# Patient Record
Sex: FEMALE | Race: WHITE | NOT HISPANIC OR LATINO
[De-identification: names, ages, dates, MRNs, and addresses within clinical notes are randomized per-mention and may not be internally consistent; named-entity substitution may affect disease eponyms.]

---

## 2019-11-21 PROBLEM — Z00.00 ENCOUNTER FOR PREVENTIVE HEALTH EXAMINATION: Status: ACTIVE | Noted: 2019-11-21

## 2020-02-24 ENCOUNTER — APPOINTMENT (OUTPATIENT)
Dept: PEDIATRIC MEDICAL GENETICS | Facility: CLINIC | Age: 25
End: 2020-02-24

## 2020-07-01 ENCOUNTER — APPOINTMENT (OUTPATIENT)
Dept: PEDIATRIC MEDICAL GENETICS | Facility: CLINIC | Age: 25
End: 2020-07-01
Payer: COMMERCIAL

## 2020-07-01 ENCOUNTER — TRANSCRIPTION ENCOUNTER (OUTPATIENT)
Age: 25
End: 2020-07-01

## 2020-07-01 DIAGNOSIS — F41.9 ANXIETY DISORDER, UNSPECIFIED: ICD-10-CM

## 2020-07-01 DIAGNOSIS — Z84.81 FAMILY HISTORY OF CARRIER OF GENETIC DISEASE: ICD-10-CM

## 2020-07-01 PROCEDURE — 99244 OFF/OP CNSLTJ NEW/EST MOD 40: CPT | Mod: GT

## 2020-07-01 NOTE — HISTORY OF PRESENT ILLNESS
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 She was found to have low platelets on a routine physical and was referred to Hematology. Candice reports longstanding thrombocytopenia, reportedly since middle school. She also has a history of fatigue, difficulty keeping up with her peers, bone pain, easy bruising, abdominal fullness, stomach pains, syncopal episodes, and headaches. Her thrombocytopenia work up showed platelets fluctuating from ~50K-90K. 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 Bone marrow biopsy was done and showed Gaucher cells. Abdominal ultrasound reportedly showed hepatosplenomegaly. 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 She was referred to Dr. Pollock at [place]. Dr. Pollock initially saw her in May 2016 and noted splenomegaly on exam and osteopenia on bone density scan. She was started on Cerdelga 84 mg BID in July 2016 subsequent to CYP2D6 testing and normal EKG. Candice reports that she was compliant with treatment for ~1.5 years but Dr. Pollock noted in May 2017 that she was less than 80% compliant. Candice recalls she had no significant improvements on Cerdelga after 1.5 years and therefore decided to discontinue therapy. Her last Gaucher disease monitoring evaluation was in January 2019. She reports she was found to have progressed to osteoporosis in her hips. She reports more hip and knee pain. She describes some "popping" in her shoulders. She walks a lot but does not do other exercise as it causes exacerbation of her hip pain. She has fatigue, but doesn't feel that it's as severe as it was prior to her diagnosis. She reports difficulty breathing occurring every 1-2 weeks. Can't take a deep breath.  She sometimes has diarrhea, but notes that it is primarily when she eats gluten. She has a history of anxiety, but is currently managing it without medication.

## 2020-07-01 NOTE — REVIEW OF SYSTEMS
[Nl] : Neurological [NI] : Endocrine [Dizziness] : dizziness [Lightheadedness] : lightheadedness [FreeTextEntry4] : easy bruising [FreeTextEntry1] : difficulty breathing every 1-2 weeks [FreeTextEntry2] : gluten sensitivity

## 2020-07-01 NOTE — REASON FOR VISIT
[Initial - Scheduled] : [unfilled]  is being seen for  ~M an initial scheduled visit [Home] : at home, [unfilled] , at the time of the visit. [Medical Office: ([facility])___] : at the medical office located in  [Other:____] : [unfilled]

## 2020-07-01 NOTE — FAMILY HISTORY
[FreeTextEntry1] : Candice is the child of a non-consanguineous couple of Kh[place] (mother) and mixed  (father) descent.  She has 2 full siblings and 2 maternal half siblings. One full sister and one maternal half sister have been found to be carriers of Gaucher disease. She had a maternal half brother who had trisomy 18 and  in infancy. The rest of the family history is unremarkable and is not significant for birth defects, cognitive disabilities, autism, seizures, musculoskeletal conditions, bleeding conditions, or multiple miscarriages.\par

## 2020-08-24 ENCOUNTER — NON-APPOINTMENT (OUTPATIENT)
Age: 25
End: 2020-08-24

## 2020-12-09 ENCOUNTER — NON-APPOINTMENT (OUTPATIENT)
Age: 25
End: 2020-12-09

## 2021-06-08 ENCOUNTER — NON-APPOINTMENT (OUTPATIENT)
Age: 26
End: 2021-06-08

## 2021-07-02 ENCOUNTER — APPOINTMENT (OUTPATIENT)
Dept: PEDIATRIC MEDICAL GENETICS | Facility: CLINIC | Age: 26
End: 2021-07-02
Payer: COMMERCIAL

## 2021-07-02 PROCEDURE — ZZZZZ: CPT

## 2021-07-02 NOTE — FAMILY HISTORY
[FreeTextEntry1] : Candice is the child of a non-consanguineous couple of [place] (mother) and mixed  (father) descent.  She has 2 full siblings and 2 maternal half siblings. One full sister and one maternal half sister have been found to be carriers of Gaucher disease. She had a maternal half brother who had trisomy 18 and  in infancy. The rest of the family history is unremarkable and is not significant for birth defects, cognitive disabilities, autism, seizures, musculoskeletal conditions, bleeding conditions, or multiple miscarriages.\par

## 2021-07-02 NOTE — REVIEW OF SYSTEMS
[Negative] : Neurological [FreeTextEntry9] : bone pain, primarily around hips and knees after walking long distances

## 2021-07-02 NOTE — PHYSICAL EXAM
[Normal] : without anomalies of lips, teeth or palate [Tandem Gait Normal] : tandem gait normal [de-identified] : mild joint limitations in hands [de-identified] : no tremors

## 2021-07-02 NOTE — HISTORY OF PRESENT ILLNESS
[FreeTextEntry1] : Updated July 2, 2021\sadie Harvey is a 25 year old woman being seen in follow up for Gaucher disease monitoring. She has been on enzyme replacement therapy of Cerezyme since August 2020 and has been doing well on it. She had extreme radiating pain from her hips to her feet during the first infusion. The infusion was stopped for 10 minutes and the pain resolved. It was then restarted at a slower rate. The pain returned, but it was milder. At her next infusion, she was premedicated with 650 mg of Tylenol and started at the initial infusion rate. The pain returned, so the rate was reduced. She reports that she was kept on the slower infusion rate for a while, but is now back to 100 ml/hour. She has not had a recurrence of pain, but does report feeling tired after the infusions. She has had to miss a few infusions this past year, but not more than 2 in a row. Candice reports feeling significant improvements since starting treatment. She is no longer as fatigued, no longer has abdominal pain, diarrhea, headaches, or difficulty breathing. She still has pain in hips and knees. However, prior to treatment she had frequent pain and is now only experiencing pain if she has walked a long distance, is going up stairs or while riding a bicycle. She says the pain is now "less random". She has not had any bone fractures. She still has easy bruising. She saw a neurologist a few months ago due to pain and numbness in the left side of her head and face. She did not have a brain MRI or EEG. Shortly after the initial neurology evaluation, she saw her gynecologist who felt the pain was likely related to her diagnosis of PCOS. She started doing acupuncture ~4 months ago and reports resolution of the pain and improvements in menstrual cramping. She has not had any major illnesses, ED visits, hospitalizations, or surgeries in the past year. Candice did not get any of the blood work or MRI's done last year, which were recommended for baseline prior to starting on Cerezyme. \par \par July 1, 2020\sadie Harvey was diagnosed with Gaucher disease at ~19 years of age. She was found to have low platelets on a routine physical and was referred to Hematology. Candice reports longstanding thrombocytopenia, reportedly since middle school. She also has a history of fatigue, difficulty keeping up with her peers, bone pain, easy bruising, abdominal fullness, stomach pains, syncopal episodes, and headaches. Her thrombocytopenia work up showed platelets fluctuating from ~50K-90K. 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 Bone marrow biopsy was done and showed Gaucher cells. Abdominal ultrasound reportedly showed hepatosplenomegaly. 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 She was referred to Dr. Pollock at [place]. Dr. Pollock initially saw her in May 2016 and noted splenomegaly on exam and osteopenia on bone density scan. She was started on Cerdelga 84 mg BID in July 2016 subsequent to CYP2D6 testing and normal EKG. Candice reports that she was compliant with treatment for ~1.5 years but Dr. Pollock noted in May 2017 that she was less than 80% compliant. Candice recalls she had no significant improvements on Cerdelga after 1.5 years and therefore decided to discontinue therapy. Her last Gaucher disease monitoring evaluation was in January 2019. She reports she was found to have progressed to osteoporosis in her hips. She reports more hip and knee pain. She describes some "popping" in her shoulders. She walks a lot but does not do other exercise as it causes exacerbation of her hip pain. She has fatigue, but doesn't feel that it's as severe as it was prior to her diagnosis. She reports difficulty breathing occurring every 1-2 weeks. Can't take a deep breath.  She sometimes has diarrhea, but notes that it is primarily when she eats gluten. She has a history of anxiety, but is currently managing it without medication.

## 2021-07-02 NOTE — PHYSICAL EXAM
[Normal] : without anomalies of lips, teeth or palate [Tandem Gait Normal] : tandem gait normal [de-identified] : mild joint limitations in hands [de-identified] : no tremors

## 2021-07-02 NOTE — FAMILY HISTORY
[FreeTextEntry1] : Candice is the child of a non-consanguineous couple of [place] sor[place] (mother) and mixed  (father) descent.  She has 2 full siblings and 2 maternal half siblings. One full sister and one maternal half sister have been found to be carriers of Gaucher disease. She had a maternal half brother who had trisomy 18 and  in infancy. The rest of the family history is unremarkable and is not significant for birth defects, cognitive disabilities, autism, seizures, musculoskeletal conditions, bleeding conditions, or multiple miscarriages.\par

## 2021-07-02 NOTE — REASON FOR VISIT
[Follow-Up] : [unfilled]  is being seen for  ~M a follow-up visit [Home] : at home, [unfilled] , at the time of the visit. [Other Location: e.g. Home (Enter Location, City,State)___] : at [unfilled] [Other:____] : [unfilled] [Verbal consent obtained from patient] : the patient, [unfilled]

## 2021-08-11 ENCOUNTER — NON-APPOINTMENT (OUTPATIENT)
Age: 26
End: 2021-08-11

## 2022-03-11 ENCOUNTER — LABORATORY RESULT (OUTPATIENT)
Age: 27
End: 2022-03-11

## 2022-04-25 ENCOUNTER — APPOINTMENT (OUTPATIENT)
Dept: PEDIATRIC MEDICAL GENETICS | Facility: CLINIC | Age: 27
End: 2022-04-25
Payer: COMMERCIAL

## 2022-04-25 ENCOUNTER — TRANSCRIPTION ENCOUNTER (OUTPATIENT)
Age: 27
End: 2022-04-25

## 2022-04-25 PROCEDURE — 99215 OFFICE O/P EST HI 40 MIN: CPT | Mod: 95

## 2022-04-25 RX ORDER — IMIGLUCERASE 40 U/ML
400 INJECTION, POWDER, LYOPHILIZED, FOR SOLUTION INTRAVENOUS
Qty: 14 | Refills: 11 | Status: COMPLETED | COMMUNITY
Start: 2020-07-02 | End: 2022-07-14

## 2022-04-25 NOTE — REASON FOR VISIT
[Follow-Up] : [unfilled]  is being seen for  ~M a follow-up visit [Home] : at home, [unfilled] , at the time of the visit. [Medical Office: ([facility])___] : at the medical office located in  [Other:____] : [unfilled] [Verbal consent obtained from patient] : the patient, [unfilled]

## 2022-04-27 NOTE — END OF VISIT
[FreeTextEntry3] : I personally reviewed the chart and bloodwork and formulated the assessment and plan that were written in the chart by Roma Pate MS, [facility] [Time Spent: ___ minutes] : I have spent [unfilled] minutes of time on the encounter.

## 2022-04-27 NOTE — HISTORY OF PRESENT ILLNESS
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 She presented with low platelets on a routine physical and was referred to Hematology. Candice reports she had longstanding thrombocytopenia, reportedly since middle school, and also had a history of fatigue, difficulty keeping up with her peers, bone pain, easy bruising, abdominal fullness, stomach pains, syncopal episodes, and headaches. Her thrombocytopenia work up showed platelets fluctuating from ~50K-90K. 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 Bone marrow biopsy was done and showed Gaucher cells. Abdominal ultrasound reportedly showed hepatosplenomegaly. 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 She initiated care with Dr. Pollock at [place] in 2016 and was noted to have splenomegaly on exam and osteopenia on bone density scan. She was started on Cerdelga 84 mg BID in July 2016 subsequent to CYP2D6 testing and normal EKG. Candice reports that she was compliant with treatment for ~1.5 years but Dr. Pollock noted in May 2017 that she was less than 80% compliant. Candice recalls she had no significant improvements on Cerdelga after 1.5 years and therefore decided to discontinue therapy. She transferred her care to Dr. Poe in July 2020 and was started on enzyme replacement therapy of Cerezyme in August 2020 at a dose of 60 units/kg. She had her first infusion without premedication, but experienced radiating pain in her hip and legs 15 minutes into the infusion. Dr. Poe started her on premedication with 650 mg of acetaminophen. Until March 2022, she was getting infusions essentially every 2 weeks, with the exception of when she was on vacations. However, she has had recent difficulties with scheduling due to her work and school schedule and the limited hours of the infusion center ([facility] [place]). Therefore, Candice's last infusion was on March 8, 2022. Candice has continued to premedicate with 650 mg of Tylenol. She has not had another episode of pain during the infusion, but she reports dizziness and lightheadedness during or after the infusion approximately half the time, one episode of low blood pressure after an infusion, and abdominal pain a few days after her infusions, with occasional episodes of vomiting and diarrhea. \par \par Candice denies unusual fatigue. She has pain in her hips and knees when standing for more than 15-20 minutes. If she is on her feet for a long time, the next few days she is in a great deal of pain. When she has to walk long distances, she also has significant pain. No bone breaks or fractures. She has easy bruising but this has improved since starting infusions. She has a history of gum bleeding, but this has also improved since starting infusions. She has issues with constipation and diarrhea but attributes it to her poor dietary habits, though she does report improvement since starting infusions. She is still having "bad breathing days". She previously reported difficulty breathing occurring every 1-2 weeks, but reports increasing frequency recently. She has symptoms of seasonal allergies including swollen and watery eyes. She has headaches up to 1-3 times per week. She has occasional numbness on the left side of her face. She has been seen by Neurology, but no follow up was recommended. She reports that she occasionally feels shaky and has had 4 episodes of not being able to maintain her hand grasps. She is not sure if the episodes are related to her infusions. \par \par She is taking buspirone for anxiety, prescribed by an psychiatrist who she sees every 6 weeks. Her most recent reported weight is 99 pounds. \par \sadie Harvey had MRI of the thighs/femurs July 2021, which showed normal bone marrow signal and no avascular necrosis. Dexa scan was also done in July 2021 and showed Z score below expected range for her age. She was referred to Endocrinology, but has not yet made an appointment. \par F/u in late October

## 2022-04-27 NOTE — DATA REVIEWED
[FreeTextEntry1] : We reviewed the following:\par -previous Medical Genetics consult notes\par -PYE115 2D6 genotyping, reported 3/18/22 (normal metabolizer)\par -CBC, CMP, protein electrophoresis\par -plasma glucopsychosine\par -chitotriosidase

## 2022-05-12 ENCOUNTER — APPOINTMENT (OUTPATIENT)
Dept: CARDIOLOGY | Facility: CLINIC | Age: 27
End: 2022-05-12
Payer: COMMERCIAL

## 2022-05-12 ENCOUNTER — NON-APPOINTMENT (OUTPATIENT)
Age: 27
End: 2022-05-12

## 2022-05-12 VITALS
HEIGHT: 60 IN | BODY MASS INDEX: 19.24 KG/M2 | OXYGEN SATURATION: 99 % | WEIGHT: 98 LBS | SYSTOLIC BLOOD PRESSURE: 96 MMHG | DIASTOLIC BLOOD PRESSURE: 60 MMHG | HEART RATE: 74 BPM

## 2022-05-12 DIAGNOSIS — Z78.9 OTHER SPECIFIED HEALTH STATUS: ICD-10-CM

## 2022-05-12 DIAGNOSIS — Z82.49 FAMILY HISTORY OF ISCHEMIC HEART DISEASE AND OTHER DISEASES OF THE CIRCULATORY SYSTEM: ICD-10-CM

## 2022-05-12 PROCEDURE — 99203 OFFICE O/P NEW LOW 30 MIN: CPT | Mod: 25

## 2022-05-12 PROCEDURE — 93000 ELECTROCARDIOGRAM COMPLETE: CPT

## 2022-05-12 NOTE — DISCUSSION/SUMMARY
[FreeTextEntry1] : 26 year old female with with PMhx of Gaucher Disease (Type I)  and anxiety presents for a cardiac evaluation and screening ECG prior to starting Cerdelga® (eliglustat), the oral substrate-reduction therapy. It is noted that at high plasma concentration levels this medication may cause increase in intervals (TN, QT, QRS) and may cause arrhythmias. Patient was on this medication in the past and reported no issues. \par \par Patient has no chest pain, SOB, or significant palpitations. No lightheadedness or syncope.\par \par There is no history of MI, CVA, CHF, or previous coronary intervention.\par \par Stable from a cardiology standpoint to proceed with eliglustat therapy. Patient should have periodic screening ECGs, which she will follow up in our office for while on medical therapy.

## 2022-05-12 NOTE — REVIEW OF SYSTEMS
[Fever] : no fever [Chills] : no chills [Feeling Fatigued] : feeling fatigued [Negative] : Neurological [de-identified] : see HPI [de-identified] : see HPI

## 2022-05-12 NOTE — CARDIOLOGY SUMMARY
[de-identified] : 5/12/2022, NSR (mildly short DC interval). All other intervals within normal limits.

## 2022-05-12 NOTE — HISTORY OF PRESENT ILLNESS
[FreeTextEntry1] : 26 year old female with with PMhx of Gaucher Disease (Type I)  and anxiety presents for a cardiac evaluation and screening ECG prior to starting Cerdelga® (eliglustat), the oral substrate-reduction therapy. It is noted that at high plasma concentration levels this medication may cause increase in intervals (WV, QT, QRS) and may cause arrhythmias. Patient was on this medication in the past and reported no issues. \par \par Patient has no chest pain, SOB, or significant palpitations. No lightheadedness or syncope.\par \par There is no history of MI, CVA, CHF, or previous coronary intervention.\par

## 2022-07-14 ENCOUNTER — APPOINTMENT (OUTPATIENT)
Dept: PEDIATRIC MEDICAL GENETICS | Facility: CLINIC | Age: 27
End: 2022-07-14

## 2022-07-19 ENCOUNTER — NON-APPOINTMENT (OUTPATIENT)
Age: 27
End: 2022-07-19

## 2022-08-04 ENCOUNTER — APPOINTMENT (OUTPATIENT)
Dept: CARDIOLOGY | Facility: CLINIC | Age: 27
End: 2022-08-04

## 2022-08-04 ENCOUNTER — NON-APPOINTMENT (OUTPATIENT)
Age: 27
End: 2022-08-04

## 2022-08-04 VITALS
WEIGHT: 100 LBS | DIASTOLIC BLOOD PRESSURE: 62 MMHG | SYSTOLIC BLOOD PRESSURE: 102 MMHG | HEIGHT: 60 IN | HEART RATE: 91 BPM | BODY MASS INDEX: 19.63 KG/M2 | OXYGEN SATURATION: 100 %

## 2022-08-04 PROCEDURE — 99213 OFFICE O/P EST LOW 20 MIN: CPT | Mod: 25

## 2022-08-04 PROCEDURE — 93000 ELECTROCARDIOGRAM COMPLETE: CPT

## 2022-08-04 NOTE — HISTORY OF PRESENT ILLNESS
[FreeTextEntry1] : Historical Perspective:\par 26 year old female with with PMhx of Gaucher Disease (Type I)  and anxiety presents for a cardiac evaluation and screening ECG prior to starting Cerdelga® (eliglustat), the oral substrate-reduction therapy. It is noted that at high plasma concentration levels this medication may cause increase in intervals (MI, QT, QRS) and may cause arrhythmias. Patient was on this medication in the past and reported no issues. \par \par Patient has no chest pain, SOB, or significant palpitations. No lightheadedness or syncope.\par \par There is no history of MI, CVA, CHF, or previous coronary intervention.\par \par Current Health Status:\par Since seeing me last patient started on Cerdelga for the past two months. No issues. No lightheadedness, syncope. \par

## 2022-08-04 NOTE — REVIEW OF SYSTEMS
[Fever] : no fever [Chills] : no chills [Feeling Fatigued] : feeling fatigued [Negative] : Neurological [de-identified] : see HPI [de-identified] : see HPI

## 2022-08-04 NOTE — CARDIOLOGY SUMMARY
[de-identified] : 8/4/2022, NSR (short AZ interval, no pre-excitation, all other intervals within normal limits.\par 5/12/2022, NSR (mildly short AZ interval, no pre-excitation), all other intervals within normal limits.

## 2022-08-04 NOTE — DISCUSSION/SUMMARY
[FreeTextEntry1] : 26 year old female with with PMhx of Gaucher Disease (Type I)  and anxiety presents for a cardiac evaluation and screening ECG prior to starting Cerdelga® (eliglustat), the oral substrate-reduction therapy. It is noted that at high plasma concentration levels this medication may cause increase in intervals (MS, QT, QRS) and may cause arrhythmias. Patient was on this medication in the past and reported no issues.\par \par Patient currently on medication for two months. No issues.  \par \par No chest pain, SOB, or significant palpitations. No lightheadedness or syncope.\par \par There is no history of MI, CVA, CHF, or previous coronary intervention.\par \par ECG today is stable. Stable from a cardiology standpoint to proceed with eliglustat therapy. \par \par Patient should have another ECG in 6 months. If stable at that time, yearly ECGs appropriate, as long as no potential cardiac symptoms.

## 2023-02-07 ENCOUNTER — NON-APPOINTMENT (OUTPATIENT)
Age: 28
End: 2023-02-07

## 2023-02-07 ENCOUNTER — APPOINTMENT (OUTPATIENT)
Dept: CARDIOLOGY | Facility: CLINIC | Age: 28
End: 2023-02-07
Payer: COMMERCIAL

## 2023-02-07 VITALS
BODY MASS INDEX: 19.24 KG/M2 | WEIGHT: 98 LBS | SYSTOLIC BLOOD PRESSURE: 102 MMHG | HEART RATE: 84 BPM | OXYGEN SATURATION: 99 % | HEIGHT: 60 IN | DIASTOLIC BLOOD PRESSURE: 60 MMHG

## 2023-02-07 DIAGNOSIS — Z13.6 ENCOUNTER FOR SCREENING FOR CARDIOVASCULAR DISORDERS: ICD-10-CM

## 2023-02-07 PROCEDURE — 99213 OFFICE O/P EST LOW 20 MIN: CPT | Mod: 25

## 2023-02-07 PROCEDURE — 93000 ELECTROCARDIOGRAM COMPLETE: CPT

## 2023-02-07 NOTE — CARDIOLOGY SUMMARY
[de-identified] : 2/7/2023, NSR (short PA interval, no pre-excitation), all other intervals within normal limits \par 8/4/2022, NSR (short PA interval, no pre-excitation), all other intervals within normal limits\par 5/12/2022, NSR (mildly short PA interval, no pre-excitation), all other intervals within normal limits.

## 2023-02-07 NOTE — DISCUSSION/SUMMARY
[FreeTextEntry1] : 26 year old female with with PMhx of Gaucher Disease (Type I)  and anxiety presents for a cardiac evaluation and screening ECG prior to starting Cerdelga® (eliglustat), the oral substrate-reduction therapy. It is noted that at high plasma concentration levels this medication may cause increase in intervals (AZ, QT, QRS) and may cause arrhythmias. Patient was on this medication in the past and reported no issues.\par \par Patient currently on medication. No issues.  \par \par No chest pain, SOB, or significant palpitations. No lightheadedness or syncope.\par \par There is no history of MI, CVA, CHF, or previous coronary intervention.\par \par ECG today is stable. Stable from a cardiology standpoint to proceed with eliglustat therapy. \par \par Patient should have another ECG in 12 months.  [EKG obtained to assist in diagnosis and management of assessed problem(s)] : EKG obtained to assist in diagnosis and management of assessed problem(s)

## 2023-02-07 NOTE — HISTORY OF PRESENT ILLNESS
[FreeTextEntry1] : Historical Perspective:\par 26 year old female with with PMhx of Gaucher Disease (Type I)  and anxiety presents for a cardiac evaluation and screening ECG prior to starting Cerdelga® (eliglustat), the oral substrate-reduction therapy. It is noted that at high plasma concentration levels this medication may cause increase in intervals (VT, QT, QRS) and may cause arrhythmias. Patient was on this medication in the past and reported no issues. \par \par Patient has no chest pain, SOB, or significant palpitations. No lightheadedness or syncope.\par \par There is no history of MI, CVA, CHF, or previous coronary intervention.\par \par Current Health Status:\par Since seeing me last patient started on Cerdelga for the past two months. No issues. No lightheadedness, syncope. \par

## 2023-02-07 NOTE — REVIEW OF SYSTEMS
[Feeling Fatigued] : feeling fatigued [Negative] : Neurological [Fever] : no fever [Chills] : no chills [de-identified] : see HPI [de-identified] : see HPI

## 2023-05-30 ENCOUNTER — APPOINTMENT (OUTPATIENT)
Dept: RHEUMATOLOGY | Facility: CLINIC | Age: 28
End: 2023-05-30
Payer: COMMERCIAL

## 2023-05-30 ENCOUNTER — NON-APPOINTMENT (OUTPATIENT)
Age: 28
End: 2023-05-30

## 2023-05-30 VITALS
SYSTOLIC BLOOD PRESSURE: 115 MMHG | BODY MASS INDEX: 21.2 KG/M2 | TEMPERATURE: 98 F | HEART RATE: 79 BPM | DIASTOLIC BLOOD PRESSURE: 72 MMHG | HEIGHT: 60 IN | OXYGEN SATURATION: 98 % | WEIGHT: 108 LBS

## 2023-05-30 DIAGNOSIS — R23.1 PALLOR: ICD-10-CM

## 2023-05-30 PROCEDURE — 99204 OFFICE O/P NEW MOD 45 MIN: CPT

## 2023-05-30 NOTE — PHYSICAL EXAM
[General Appearance - Alert] : alert [General Appearance - In No Acute Distress] : in no acute distress [General Appearance - Well Developed] : well developed [General Appearance - Well-Appearing] : healthy appearing [Sclera] : the sclera and conjunctiva were normal [Extraocular Movements] : extraocular movements were intact [Outer Ear] : the ears and nose were normal in appearance [Neck Appearance] : the appearance of the neck was normal [Exaggerated Use Of Accessory Muscles For Inspiration] : no accessory muscle use [Edema] : there was no peripheral edema [] : no rash [Skin Lesions] : no skin lesions [FreeTextEntry1] : 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

## 2023-05-30 NOTE — ASSESSMENT
[FreeTextEntry1] : 27F with Gauchers disease with reported chronic thrombocytopenia and reported early onset osteoporosis, also anxiety, here for evaluation of multiple symptoms including fatigue, oral ulcers and reported rash/erythema in malar distribution (although currently not having facial erythema).\par \par Unclear if these symptoms can be explained by Gaucher disease but at this time I will evaluate for autoimmune conditions including SLE, Sjogrens, RA, and scleroderma, as well as antithyroid antibodies as those could cause fatigue as well.\par Patient with +Raynauds on exam and livedo reticularis- Raynauds may be either primary or secondary to an autoimmune condition- regardless of etiology, pt was advised to keep hands and feet warm- if it becomes a frequent issue or she develops digital ulcerations she was advised to call the office and she would need treatment with calcium channel blockers. \par \par Further plan to follow pending results of bloodwork. \par Pt was advised to see ENT specialist regarding vertigo and hissing sounds in ear.\par Will also see a pulmonologist as she gets sporadic bouts of having to take deep breaths/dyspnea- which she is not having currently.

## 2023-05-30 NOTE — HISTORY OF PRESENT ILLNESS
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 \par \par -hissing/popping sounds in ears, as well as vertigo for the past year\par - numbness below L. eye and L. sided headache occasionally\par - redness on face in malar distribution, not entirely but covering nasal bridge- not currently having this erythema. Occurs sporadically, feels hot. \par - eyelids swell b/l and feel sore- has had this for a long time; more often than facial erythema\par - significant fatigue, out of proportion to Gaucher- needs to sleep at least 10 hrs/night to function. \par - occasional sores on sides of tongue, occasionally painful, sometimes not.\par - hands/feet get really cold and turn purple and occasionally numb- gets numb when driving. \par - gets joint pain in b/l hips, knees, ankles, unsure if it is from her Gaucher disease, unclear if she has associated stiffness. No joint swelling. No hx of DVT/PE/miscarriage/pregnancies.\par - gets fever at least 100.3F at least once/month with no other associated symptoms. \par \par +occasional dry mouth when eyelids get swollen. +dry mouth. \par No hair loss. Weight fluctuates- no significant weight loss. \par +Raynauds. No dysphagia or skin tightening. No hematuria. \par NO dyspnea today but feels on some days it is harder for her to breathe. Is supposed to see a pulmonologist.  [Anorexia] : no anorexia [Weight Loss] : no weight loss [Malaise] : no malaise [Malar Facial Rash] : no malar facial rash [Skin Nodules] : no skin nodules [Oral Ulcers] : no oral ulcers [Dysphagia] : no dysphagia [Shortness of Breath] : no shortness of breath [Chest Pain] : no chest pain [Joint Swelling] : no joint swelling [Joint Deformity] : no joint deformity [Morning Stiffness] : no morning stiffness [Difficulty Standing] : no difficulty standing [Difficulty Walking] : no difficulty walking [Eye Pain] : no eye pain [Eye Redness] : no eye redness

## 2023-05-30 NOTE — REVIEW OF SYSTEMS
[Recent Weight Loss (___ Lbs)] : no recent weight loss [Dry Eyes] : dryness of the eyes [Cough] : no cough [Arthralgias] : arthralgias [Joint Pain] : joint pain [Joint Swelling] : no joint swelling [Joint Stiffness] : no joint stiffness [As Noted in HPI] : as noted in HPI [Anxiety] : anxiety [Negative] : Heme/Lymph [FreeTextEntry4] : hissing sounds in ear, vertigo

## 2023-06-08 ENCOUNTER — LABORATORY RESULT (OUTPATIENT)
Age: 28
End: 2023-06-08

## 2023-06-15 ENCOUNTER — APPOINTMENT (OUTPATIENT)
Dept: PEDIATRIC MEDICAL GENETICS | Facility: CLINIC | Age: 28
End: 2023-06-15
Payer: COMMERCIAL

## 2023-06-15 ENCOUNTER — NON-APPOINTMENT (OUTPATIENT)
Age: 28
End: 2023-06-15

## 2023-06-15 ENCOUNTER — APPOINTMENT (OUTPATIENT)
Dept: PEDIATRIC MEDICAL GENETICS | Facility: CLINIC | Age: 28
End: 2023-06-15

## 2023-06-15 LAB
25(OH)D3 SERPL-MCNC: 30.4 NG/ML
ALBUMIN MFR SERPL ELPH: 57.1 %
ALBUMIN SERPL ELPH-MCNC: 4.7 G/DL
ALBUMIN SERPL-MCNC: 4.5 G/DL
ALBUMIN/GLOB SERPL: 1.4 RATIO
ALP BLD-CCNC: 45 U/L
ALPHA1 GLOB MFR SERPL ELPH: 4.3 %
ALPHA1 GLOB SERPL ELPH-MCNC: 0.3 G/DL
ALPHA2 GLOB MFR SERPL ELPH: 8.9 %
ALPHA2 GLOB SERPL ELPH-MCNC: 0.7 G/DL
ALT SERPL-CCNC: 17 U/L
ANA SER IF-ACNC: NEGATIVE
ANION GAP SERPL CALC-SCNC: 13 MMOL/L
APPEARANCE: CLEAR
AST SERPL-CCNC: 30 U/L
B-GLOBULIN MFR SERPL ELPH: 12.3 %
B-GLOBULIN SERPL ELPH-MCNC: 1 G/DL
B2 GLYCOPROT1 AB SER QL: NEGATIVE
BILIRUB SERPL-MCNC: 0.4 MG/DL
BILIRUBIN URINE: NEGATIVE
BLOOD URINE: NEGATIVE
BUN SERPL-MCNC: 15 MG/DL
C3 SERPL-MCNC: 109 MG/DL
C4 SERPL-MCNC: 27 MG/DL
CALCIUM SERPL-MCNC: 9.7 MG/DL
CARDIOLIPIN AB SER IA-ACNC: POSITIVE
CCP AB SER IA-ACNC: <8 UNITS
CENTROMERE IGG SER-ACNC: <0.2 AL
CHLORIDE SERPL-SCNC: 101 MMOL/L
CO2 SERPL-SCNC: 24 MMOL/L
COLOR: YELLOW
CREAT SERPL-MCNC: 0.54 MG/DL
CREAT SPEC-SCNC: 95 MG/DL
CREAT/PROT UR: 0.1 RATIO
CRP SERPL-MCNC: <3 MG/L
DSDNA AB SER-ACNC: <12 IU/ML
EGFR: 131 ML/MIN/1.73M2
ENA RNP AB SER IA-ACNC: 0.3 AL
ENA SCL70 IGG SER IA-ACNC: <0.2 AL
ENA SM AB SER IA-ACNC: <0.2 AL
ENA SS-A AB SER IA-ACNC: <0.2 AL
ENA SS-B AB SER IA-ACNC: <0.2 AL
ERYTHROCYTE [SEDIMENTATION RATE] IN BLOOD BY WESTERGREN METHOD: 44 MM/HR
GAMMA GLOB FLD ELPH-MCNC: 1.4 G/DL
GAMMA GLOB MFR SERPL ELPH: 17.4 %
GLUCOSE QUALITATIVE U: NEGATIVE MG/DL
GLUCOSE SERPL-MCNC: 77 MG/DL
INTERPRETATION SERPL IEP-IMP: NORMAL
KETONES URINE: NEGATIVE MG/DL
LEUKOCYTE ESTERASE URINE: NEGATIVE
NITRITE URINE: NEGATIVE
PH URINE: 7.5
POTASSIUM SERPL-SCNC: 4 MMOL/L
PROT SERPL-MCNC: 7.7 G/DL
PROT SERPL-MCNC: 7.8 G/DL
PROT SERPL-MCNC: 7.8 G/DL
PROT UR-MCNC: 10 MG/DL
PROTEIN URINE: NEGATIVE MG/DL
RF+CCP IGG SER-IMP: NEGATIVE
RHEUMATOID FACT SER QL: <10 IU/ML
SODIUM SERPL-SCNC: 138 MMOL/L
SPECIFIC GRAVITY URINE: 1.02
THYROGLOB AB SERPL-ACNC: <20 IU/ML
THYROPEROXIDASE AB SERPL IA-ACNC: 21.3 IU/ML
TSH SERPL-ACNC: 1.43 UIU/ML
UROBILINOGEN URINE: 0.2 MG/DL

## 2023-06-15 PROCEDURE — 99243 OFF/OP CNSLTJ NEW/EST LOW 30: CPT | Mod: 95

## 2023-06-24 ENCOUNTER — NON-APPOINTMENT (OUTPATIENT)
Age: 28
End: 2023-06-24

## 2023-06-29 NOTE — PHYSICAL EXAM
[Normal shape and position] : normal shape and position [Normal] : normal external nasal bridge, nares, tip [de-identified] : no facial rash present today  [de-identified] : limited PE due to telehealth appointment

## 2023-06-29 NOTE — REVIEW OF SYSTEMS
[Fatigue] : fatigue [Tinnitus] : tinnitus [SOB] : shortness of breath [Joint Pain] : joint pain [Rash] : rash [Negative] : Psychiatric [de-identified] : periorbital swelling  [de-identified] : Raynaud's  [de-identified] : hand tremors

## 2023-06-29 NOTE — REVIEW OF SYSTEMS
[Fatigue] : fatigue [Tinnitus] : tinnitus [SOB] : shortness of breath [Joint Pain] : joint pain [Rash] : rash [Negative] : Psychiatric [de-identified] : periorbital swelling  [de-identified] : Raynaud's  [de-identified] : hand tremors

## 2023-06-29 NOTE — REASON FOR VISIT
[Follow-Up] : [unfilled]  is being seen for  ~M a follow-up visit [Home] : at home, [unfilled] , at the time of the visit. [Medical Office: ([facility])___] : at the medical office located in  [Other:____] : [unfilled] [Patient] : the patient [FreeTextEntry3] : Gaucher disease type 1

## 2023-06-29 NOTE — PHYSICAL EXAM
[Normal shape and position] : normal shape and position [Normal] : normal external nasal bridge, nares, tip [de-identified] : no facial rash present today  [de-identified] : limited PE due to telehealth appointment

## 2023-06-29 NOTE — HISTORY OF PRESENT ILLNESS
[FreeTextEntry1] : Candice is a 27 year old woman being seen in follow up for type 1 Gaucher disease. She has been on Cerdelga since June 2022 and reports feeling well on the medication with no reported significant periods of missed doses. She was recently evaluated by Rheumatology for facial redness, Raynaud's, periorbital swelling, joint pain in her knees, ankles and feet, vertigo, fatigue, mouth sores, and occasional low grade fevers (never exceeds 101F).  Her autoimmune workup and labs were all normal.  She had a mildly elevated ESR to 44.  Repeat was recommended in 6 months. She was advised to see ENT and pulmonology but she has not yet scheduled. She is followed by Cardiology. \par \par Candice has a history of anxiety and has been taking buspirone since 2020. She was initially on 7.5 mg.  She is currently being weaned off the medication and is taking 3.25mg.  Candice is concerned because she has been having hand tremors which have been going on for 1 year in which she experiences shaking hands for ~20 minutes/ 1x/week.  Prescription is managed by her psychiatrist at [facility]. \par \par There is no family history of autoimmune issues.  Candice reports having joint pain in her hips, knees and ankles and legs.  She denies abdominal fullness, discomfort, and N/V/D.  Denies easy bleeding or bruising.  \par \par

## 2023-06-29 NOTE — HISTORY OF PRESENT ILLNESS
[FreeTextEntry1] : Candice is a 27 year old woman being seen in follow up for type 1 Gaucher disease. She has been on Cerdelga since June 2022 and reports feeling well on the medication with no reported significant periods of missed doses. She was recently evaluated by Rheumatology for facial redness, Raynaud's, periorbital swelling, joint pain in her knees, ankles and feet, vertigo, fatigue, mouth sores, and occasional low grade fevers (never exceeds 101F).  Her autoimmune workup and labs were all normal.  She had a mildly elevated ESR to 44.  Repeat was recommended in 6 months. She was advised to see ENT and pulmonology but she has not yet scheduled. She is followed by Cardiology. \par \par Candice has a history of anxiety and has been taking buspirone since 2020. She was initially on 7.5 mg.  She is currently being weaned off the medication and is taking 3.25mg.  Candice is concerned because she has been having hand tremors which have been going on for 1 year in which she experiences shaking hands for ~20 minutes/ 1x/week.  Prescription is managed by her psychiatrist at [facility]. \par \par There is no family history of autoimmune issues.  Candice reports having joint pain in her hips, knees and ankles and legs.  She denies abdominal fullness, discomfort, and N/V/D.  Denies easy bleeding or bruising.  \par \par

## 2023-06-29 NOTE — REASON FOR VISIT
[Follow-Up] : [unfilled]  is being seen for  ~M a follow-up visit [Home] : at home, [unfilled] , at the time of the visit. [Medical Office: ([facility])___] : at the medical office located in  [Other:____] : [unfilled] [Patient] : the patient [FreeTextEntry3] : Gaucher disease type 1

## 2023-07-04 DIAGNOSIS — R50.9 FEVER, UNSPECIFIED: ICD-10-CM

## 2023-07-07 ENCOUNTER — NON-APPOINTMENT (OUTPATIENT)
Age: 28
End: 2023-07-07

## 2023-07-20 ENCOUNTER — NON-APPOINTMENT (OUTPATIENT)
Age: 28
End: 2023-07-20

## 2023-07-20 LAB
CA VI IGA AB: 9.2 EU/ML
CA VI IGG AB: 11.8 EU/ML
CA VI IGM AB: 15.8 EU/ML
PSP IGA AB: 14.2 EU/ML
PSP IGG AB: NORMAL
PSP IGM AB: 5.5 EU/ML
SEROLOGY COMMENTS: NORMAL
SP-1 IGA AB: 5 EU/ML
SP-1 IGG AB: 2.6 EU/ML
SP-1 IGM AB: 27.6 EU/ML

## 2023-07-21 ENCOUNTER — OUTPATIENT (OUTPATIENT)
Dept: OUTPATIENT SERVICES | Facility: HOSPITAL | Age: 28
LOS: 1 days | End: 2023-07-21

## 2023-07-21 ENCOUNTER — APPOINTMENT (OUTPATIENT)
Dept: MRI IMAGING | Facility: CLINIC | Age: 28
End: 2023-07-21
Payer: COMMERCIAL

## 2023-07-21 ENCOUNTER — RESULT REVIEW (OUTPATIENT)
Age: 28
End: 2023-07-21

## 2023-07-21 DIAGNOSIS — E75.22 GAUCHER DISEASE: ICD-10-CM

## 2023-07-21 PROCEDURE — 73718 MRI LOWER EXTREMITY W/O DYE: CPT | Mod: 26,RT

## 2023-07-21 PROCEDURE — 73718 MRI LOWER EXTREMITY W/O DYE: CPT | Mod: 26,LT,76

## 2023-07-21 PROCEDURE — 74181 MRI ABDOMEN W/O CONTRAST: CPT | Mod: 26

## 2023-07-26 ENCOUNTER — NON-APPOINTMENT (OUTPATIENT)
Age: 28
End: 2023-07-26

## 2023-07-27 ENCOUNTER — NON-APPOINTMENT (OUTPATIENT)
Age: 28
End: 2023-07-27

## 2023-08-09 ENCOUNTER — NON-APPOINTMENT (OUTPATIENT)
Age: 28
End: 2023-08-09

## 2023-08-10 ENCOUNTER — APPOINTMENT (OUTPATIENT)
Dept: HEMATOLOGY ONCOLOGY | Facility: CLINIC | Age: 28
End: 2023-08-10
Payer: COMMERCIAL

## 2023-08-10 ENCOUNTER — OUTPATIENT (OUTPATIENT)
Dept: OUTPATIENT SERVICES | Facility: HOSPITAL | Age: 28
LOS: 1 days | End: 2023-08-10
Payer: COMMERCIAL

## 2023-08-10 ENCOUNTER — RESULT REVIEW (OUTPATIENT)
Age: 28
End: 2023-08-10

## 2023-08-10 VITALS
SYSTOLIC BLOOD PRESSURE: 102 MMHG | HEART RATE: 80 BPM | DIASTOLIC BLOOD PRESSURE: 70 MMHG | HEIGHT: 60 IN | OXYGEN SATURATION: 98 % | TEMPERATURE: 98.1 F | BODY MASS INDEX: 20.07 KG/M2 | WEIGHT: 102.2 LBS

## 2023-08-10 DIAGNOSIS — C90.0 MULTIPLE MYELOMA: ICD-10-CM

## 2023-08-10 LAB
BASOPHILS # BLD AUTO: 0.04 K/UL — SIGNIFICANT CHANGE UP (ref 0–0.2)
BASOPHILS NFR BLD AUTO: 0.4 % — SIGNIFICANT CHANGE UP (ref 0–2)
EOSINOPHIL # BLD AUTO: 0.11 K/UL — SIGNIFICANT CHANGE UP (ref 0–0.5)
EOSINOPHIL NFR BLD AUTO: 1.2 % — SIGNIFICANT CHANGE UP (ref 0–6)
HCT VFR BLD CALC: 37.8 % — SIGNIFICANT CHANGE UP (ref 34.5–45)
HGB BLD-MCNC: 12.1 G/DL — SIGNIFICANT CHANGE UP (ref 11.5–15.5)
IMM GRANULOCYTES NFR BLD AUTO: 0.3 % — SIGNIFICANT CHANGE UP (ref 0–0.9)
LYMPHOCYTES # BLD AUTO: 2.9 K/UL — SIGNIFICANT CHANGE UP (ref 1–3.3)
LYMPHOCYTES # BLD AUTO: 32 % — SIGNIFICANT CHANGE UP (ref 13–44)
MCHC RBC-ENTMCNC: 29.3 PG — SIGNIFICANT CHANGE UP (ref 27–34)
MCHC RBC-ENTMCNC: 32 GM/DL — SIGNIFICANT CHANGE UP (ref 32–36)
MCV RBC AUTO: 91.5 FL — SIGNIFICANT CHANGE UP (ref 80–100)
MISCELLANEOUS TEST: NORMAL
MONOCYTES # BLD AUTO: 0.51 K/UL — SIGNIFICANT CHANGE UP (ref 0–0.9)
MONOCYTES NFR BLD AUTO: 5.6 % — SIGNIFICANT CHANGE UP (ref 2–14)
NEUTROPHILS # BLD AUTO: 5.48 K/UL — SIGNIFICANT CHANGE UP (ref 1.8–7.4)
NEUTROPHILS NFR BLD AUTO: 60.5 % — SIGNIFICANT CHANGE UP (ref 43–77)
NRBC # BLD: 0 /100 WBCS — SIGNIFICANT CHANGE UP (ref 0–0)
PLATELET # BLD AUTO: 73 K/UL — LOW (ref 150–400)
RBC # BLD: 4.13 M/UL — SIGNIFICANT CHANGE UP (ref 3.8–5.2)
RBC # FLD: 13.2 % — SIGNIFICANT CHANGE UP (ref 10.3–14.5)
WBC # BLD: 9.07 K/UL — SIGNIFICANT CHANGE UP (ref 3.8–10.5)
WBC # FLD AUTO: 9.07 K/UL — SIGNIFICANT CHANGE UP (ref 3.8–10.5)

## 2023-08-10 PROCEDURE — 99215 OFFICE O/P EST HI 40 MIN: CPT

## 2023-08-10 RX ORDER — ACETAMINOPHEN 325 MG/1
325 TABLET, FILM COATED ORAL
Qty: 60 | Refills: 3 | Status: ACTIVE | COMMUNITY
Start: 2023-08-10 | End: 1900-01-01

## 2023-08-10 RX ORDER — ACETAMINOPHEN 325 MG/1
325 TABLET, FILM COATED ORAL
Qty: 60 | Refills: 3 | Status: DISCONTINUED | COMMUNITY
Start: 2023-08-10 | End: 2023-08-10

## 2023-08-10 RX ORDER — IMIGLUCERASE 40 U/ML
400 INJECTION, POWDER, LYOPHILIZED, FOR SOLUTION INTRAVENOUS
Qty: 14 | Refills: 12 | Status: ACTIVE | COMMUNITY
Start: 2023-08-10 | End: 1900-01-01

## 2023-08-10 RX ORDER — EPINEPHRINE 0.3 MG/.3ML
0.3 INJECTION INTRAMUSCULAR
Qty: 1 | Refills: 1 | Status: DISCONTINUED | COMMUNITY
Start: 2022-04-25 | End: 2023-08-10

## 2023-08-10 RX ORDER — EPINEPHRINE 0.3 MG/.3ML
0.3 INJECTION INTRAMUSCULAR
Qty: 1 | Refills: 1 | Status: ACTIVE | COMMUNITY
Start: 2023-08-10 | End: 1900-01-01

## 2023-08-10 RX ORDER — BUSPIRONE HYDROCHLORIDE 7.5 MG/1
7.5 TABLET ORAL DAILY
Refills: 0 | Status: DISCONTINUED | COMMUNITY
End: 2023-08-10

## 2023-08-10 NOTE — PHYSICAL EXAM
[Restricted in physically strenuous activity but ambulatory and able to carry out work of a light or sedentary nature] : Status 1- Restricted in physically strenuous activity but ambulatory and able to carry out work of a light or sedentary nature, e.g., light house work, office work [Normal] : normal appearance, no rash, nodules, vesicles, ulcers, erythema [de-identified] : generally well appearing female, NAD, mildly anxious

## 2023-08-10 NOTE — RESULTS/DATA
[FreeTextEntry1] : #Spinal lesion- MR abdomen 7/21/23- 1cm L renal kidney, 1.1cm T2 bright lesion in the lower lumbar spine  Gaucher's disease is a lysosomal storage disease which can be associated with splenomegaly and 85% of patients, hepatomegaly 62% of patients, thrombocytopenia and 68% of patients.  Other frequently reported clinical manifestations include osteopenia, bone pain, growth retardation and rarely pathologic fractures.  There are increased rates of malignancies particularly lymphoma, leukemia, multiple myeloma in patients with Gaucher's disease.  Additional findings of mild splenomegaly, multiple splenic lesion, liikely related to Gaucher cells  Imaging also revealed a 5cm lesion in the right ovary w/ internal septations These imaging abnormalities are likely related to her Gaucher's disease, however we will send basic workup to rule out metastatic carcinoma or myeloma. Will send  given ovarian lesion and she has been ordered for pelvic ultrasound. Will send myeloma studies and flow cytometry at today's visit. Persistent thrombocytopenia which is apparently a chronic problem related to her Gaucher's and was reason for initial bone marrow biopsy.  Continue to monitor.  RTC in 6-8 weeks to review imaging and labwork.   I personally have spent a total of 60 minutes of time on the date of this encounter reviewing test results, documenting findings, coordinating care and directly consulting with the patient and/or designated family member. Greater than 50% of the face to face encounter time was spent on counseling and/or coordination of care for spinal lesion, thrombocytopenia, Gaucher's disease.

## 2023-08-10 NOTE — HISTORY OF PRESENT ILLNESS
[de-identified] : Referred by: Referred by genetics Kamala Chao  Diagnosis: Spine lesion   Ms. Atkinson presented at age 27 in August 2023 for evaluation of lesion on her spine.  The patient has a medical history of Gaucher's disease, possible sjogrens.   Candice follows closely with genetics for a history of Gaucher's disease-for which she undergoes routine imaging to monitor for splenomegaly.  She underwent recent imaging in July 2023 which revealed lesions in the spleen as well as a new 1 cm lesion in the thoracic spine.  Imaging also revealed a 1 cm left renal cyst and a 5 cm lesion on her right ovary with internal septations for which a pelvic ultrasound was recommended.  Her prior imaging was reviewed from 2021 and the lesion was not present at that time.  She reports that she feels very fatigued despite sleeping 9 to 12 hours/day.  She has chronic pains in her legs hip and back.  She denies any new pains.  She reports that she saw a hematologist back in 2015 when she was 20 years old for evaluation of thrombocytopenia and ultimately underwent a bone marrow biopsy which revealed Gaucher cells.  She also follows with a rheumatologist for possible diagnosis of Sjogren's and suffers from dry eyes and mouth, facial erythema and occasional fevers.  She reports normal menses but occasionally has painful periods. Her weight has been fluctuating between 93 pounds and 107 pounds.  She reports anxiety and previously took BuSpar which she recently discontinued.  States she does acupuncture with helps with her anxiety.  Labs from 6/8/23 notable for WBC 9.96, Hb 11.8, platelet 102   MR abdomen 7/21/23- 1cm L renal kidney, 1.1cm T2 bright lesion in the lower lumbar spine  mild splenomegaly, multiple splenic lesion, liikely related to Gaucher cells  Had 5cm lesion in the right ovary w/ internal septations - needs pelvic US   Genetics: reported Gaucher disease- pending blood work per genetics   HCM:  - COVID vaccination: s/p 2 doses   SH:  - Occupation: worked as a , will be working in the [employer] center  - Living situation: moving to [place] with her   - Smoking/etoh/illicits: never smoker, 1 glass of wine 4-5 times per week  - Exercise: not very physically active, can walk short distances   FH:  - Her maternal aunt had breast cancer  - Maternal grandmother also had breast cancer  - Paternal aunt and grandfather had lung cancer, they were both smokers

## 2023-08-13 ENCOUNTER — NON-APPOINTMENT (OUTPATIENT)
Age: 28
End: 2023-08-13

## 2023-08-13 LAB
ALBUMIN SERPL ELPH-MCNC: 4.9 G/DL
ALP BLD-CCNC: 41 U/L
ALT SERPL-CCNC: 17 U/L
ANION GAP SERPL CALC-SCNC: 13 MMOL/L
AST SERPL-CCNC: 26 U/L
B2 MICROGLOB SERPL-MCNC: 1.9 MG/L
BILIRUB SERPL-MCNC: 0.5 MG/DL
BUN SERPL-MCNC: 11 MG/DL
CALCIUM SERPL-MCNC: 9.9 MG/DL
CANCER AG125 SERPL-ACNC: 7 U/ML
CHLORIDE SERPL-SCNC: 103 MMOL/L
CO2 SERPL-SCNC: 24 MMOL/L
CREAT SERPL-MCNC: 0.61 MG/DL
CRP SERPL-MCNC: <3 MG/L
DEPRECATED KAPPA LC FREE/LAMBDA SER: 1.44 RATIO
EGFR: 126 ML/MIN/1.73M2
ERYTHROCYTE [SEDIMENTATION RATE] IN BLOOD BY WESTERGREN METHOD: 36 MM/HR
FERRITIN SERPL-MCNC: 137 NG/ML
GLUCOSE SERPL-MCNC: 104 MG/DL
IGA SER QL IEP: 414 MG/DL
IGG SER QL IEP: 1108 MG/DL
IGM SER QL IEP: 229 MG/DL
IRON SATN MFR SERPL: 15 %
IRON SERPL-MCNC: 59 UG/DL
KAPPA LC CSF-MCNC: 2 MG/DL
KAPPA LC SERPL-MCNC: 2.89 MG/DL
LDH SERPL-CCNC: 153 U/L
POTASSIUM SERPL-SCNC: 4.3 MMOL/L
PROT SERPL-MCNC: 7.9 G/DL
SODIUM SERPL-SCNC: 139 MMOL/L
TIBC SERPL-MCNC: 404 UG/DL
UIBC SERPL-MCNC: 345 UG/DL

## 2023-08-15 LAB — M PROTEIN SPEC IFE-MCNC: NORMAL

## 2023-08-16 LAB
CHITOTRIOSIDASE SERPL-CCNC: 8042 NMOLES/HR/ML
MISCELLANEOUS TEST: NORMAL
PROC NAME: NORMAL

## 2023-08-17 DIAGNOSIS — E75.22 GAUCHER DISEASE: ICD-10-CM

## 2023-08-17 DIAGNOSIS — M25.551 PAIN IN RIGHT HIP: ICD-10-CM

## 2023-08-21 DIAGNOSIS — C90.00 MULTIPLE MYELOMA NOT HAVING ACHIEVED REMISSION: ICD-10-CM

## 2023-08-21 DIAGNOSIS — I73.00 RAYNAUD'S SYNDROME W/OUT GANGRENE: ICD-10-CM

## 2023-08-21 DIAGNOSIS — M35.00 SICCA SYNDROME, UNSPECIFIED: ICD-10-CM

## 2023-08-27 ENCOUNTER — NON-APPOINTMENT (OUTPATIENT)
Age: 28
End: 2023-08-27

## 2023-09-19 ENCOUNTER — APPOINTMENT (OUTPATIENT)
Dept: RHEUMATOLOGY | Facility: CLINIC | Age: 28
End: 2023-09-19
Payer: COMMERCIAL

## 2023-09-19 DIAGNOSIS — M25.50 PAIN IN UNSPECIFIED JOINT: ICD-10-CM

## 2023-09-19 PROCEDURE — 99215 OFFICE O/P EST HI 40 MIN: CPT | Mod: 95

## 2023-09-28 ENCOUNTER — APPOINTMENT (OUTPATIENT)
Dept: HEMATOLOGY ONCOLOGY | Facility: CLINIC | Age: 28
End: 2023-09-28
Payer: COMMERCIAL

## 2023-09-28 ENCOUNTER — RESULT REVIEW (OUTPATIENT)
Age: 28
End: 2023-09-28

## 2023-09-28 VITALS
WEIGHT: 107 LBS | SYSTOLIC BLOOD PRESSURE: 104 MMHG | HEART RATE: 82 BPM | DIASTOLIC BLOOD PRESSURE: 71 MMHG | BODY MASS INDEX: 21.01 KG/M2 | OXYGEN SATURATION: 98 % | HEIGHT: 60 IN | TEMPERATURE: 98.1 F

## 2023-09-28 DIAGNOSIS — N83.201 UNSPECIFIED OVARIAN CYST, RIGHT SIDE: ICD-10-CM

## 2023-09-28 DIAGNOSIS — M89.9 DISORDER OF BONE, UNSPECIFIED: ICD-10-CM

## 2023-09-28 PROCEDURE — 99214 OFFICE O/P EST MOD 30 MIN: CPT

## 2023-09-28 PROCEDURE — 85027 COMPLETE CBC AUTOMATED: CPT

## 2023-09-29 LAB
ALBUMIN SERPL ELPH-MCNC: 4.7 G/DL
ALBUMIN SERPL ELPH-MCNC: 4.9 G/DL
ALP BLD-CCNC: 42 U/L
ALP BLD-CCNC: 46 U/L
ALT SERPL-CCNC: 15 U/L
ALT SERPL-CCNC: 16 U/L
ANION GAP SERPL CALC-SCNC: 10 MMOL/L
ANION GAP SERPL CALC-SCNC: 12 MMOL/L
AST SERPL-CCNC: 20 U/L
AST SERPL-CCNC: 23 U/L
BASOPHILS # BLD AUTO: 0.03 K/UL — SIGNIFICANT CHANGE UP (ref 0–0.2)
BASOPHILS # BLD AUTO: 0.04 K/UL
BASOPHILS NFR BLD AUTO: 0.3 % — SIGNIFICANT CHANGE UP (ref 0–2)
BASOPHILS NFR BLD AUTO: 0.5 %
BILIRUB SERPL-MCNC: 0.3 MG/DL
BILIRUB SERPL-MCNC: 0.3 MG/DL
BUN SERPL-MCNC: 12 MG/DL
BUN SERPL-MCNC: 15 MG/DL
CALCIUM SERPL-MCNC: 10 MG/DL
CALCIUM SERPL-MCNC: 9.4 MG/DL
CHLORIDE SERPL-SCNC: 102 MMOL/L
CHLORIDE SERPL-SCNC: 104 MMOL/L
CO2 SERPL-SCNC: 24 MMOL/L
CO2 SERPL-SCNC: 25 MMOL/L
CREAT SERPL-MCNC: 0.57 MG/DL
CREAT SERPL-MCNC: 0.58 MG/DL
CRP SERPL-MCNC: <3 MG/L
DEPRECATED KAPPA LC FREE/LAMBDA SER: 1.53 RATIO
EGFR: 128 ML/MIN/1.73M2
EGFR: 128 ML/MIN/1.73M2
EOSINOPHIL # BLD AUTO: 0.03 K/UL — SIGNIFICANT CHANGE UP (ref 0–0.5)
EOSINOPHIL # BLD AUTO: 0.07 K/UL
EOSINOPHIL NFR BLD AUTO: 0.3 % — SIGNIFICANT CHANGE UP (ref 0–6)
EOSINOPHIL NFR BLD AUTO: 0.8 %
ERYTHROCYTE [SEDIMENTATION RATE] IN BLOOD BY WESTERGREN METHOD: 13 MM/HR
GLUCOSE SERPL-MCNC: 173 MG/DL
GLUCOSE SERPL-MCNC: 77 MG/DL
HCT VFR BLD CALC: 37.4 % — SIGNIFICANT CHANGE UP (ref 34.5–45)
HCT VFR BLD CALC: 37.5 %
HGB BLD-MCNC: 12 G/DL
HGB BLD-MCNC: 12.6 G/DL — SIGNIFICANT CHANGE UP (ref 11.5–15.5)
IGA SER QL IEP: 466 MG/DL
IGG SER QL IEP: 1232 MG/DL
IGM SER QL IEP: 238 MG/DL
IMM GRANULOCYTES NFR BLD AUTO: 0.2 %
IMM GRANULOCYTES NFR BLD AUTO: 0.3 % — SIGNIFICANT CHANGE UP (ref 0–0.9)
KAPPA LC CSF-MCNC: 1.71 MG/DL
KAPPA LC SERPL-MCNC: 2.62 MG/DL
LYMPHOCYTES # BLD AUTO: 1.23 K/UL — SIGNIFICANT CHANGE UP (ref 1–3.3)
LYMPHOCYTES # BLD AUTO: 11.1 % — LOW (ref 13–44)
LYMPHOCYTES # BLD AUTO: 2.92 K/UL
LYMPHOCYTES NFR BLD AUTO: 34 %
MAN DIFF?: NORMAL
MCHC RBC-ENTMCNC: 30.4 PG
MCHC RBC-ENTMCNC: 30.7 PG — SIGNIFICANT CHANGE UP (ref 27–34)
MCHC RBC-ENTMCNC: 32 GM/DL
MCHC RBC-ENTMCNC: 33.7 GM/DL — SIGNIFICANT CHANGE UP (ref 32–36)
MCV RBC AUTO: 91 FL — SIGNIFICANT CHANGE UP (ref 80–100)
MCV RBC AUTO: 94.9 FL
MONOCYTES # BLD AUTO: 0.13 K/UL — SIGNIFICANT CHANGE UP (ref 0–0.9)
MONOCYTES # BLD AUTO: 0.52 K/UL
MONOCYTES NFR BLD AUTO: 1.2 % — LOW (ref 2–14)
MONOCYTES NFR BLD AUTO: 6.1 %
NEUTROPHILS # BLD AUTO: 5.02 K/UL
NEUTROPHILS # BLD AUTO: 9.63 K/UL — HIGH (ref 1.8–7.4)
NEUTROPHILS NFR BLD AUTO: 58.4 %
NEUTROPHILS NFR BLD AUTO: 86.8 % — HIGH (ref 43–77)
NRBC # BLD: 0 /100 WBCS — SIGNIFICANT CHANGE UP (ref 0–0)
PLATELET # BLD AUTO: 135 K/UL
PLATELET # BLD AUTO: 142 K/UL — LOW (ref 150–400)
POTASSIUM SERPL-SCNC: 3.9 MMOL/L
POTASSIUM SERPL-SCNC: 4.5 MMOL/L
PROT SERPL-MCNC: 7.5 G/DL
PROT SERPL-MCNC: 8 G/DL
RBC # BLD: 3.95 M/UL
RBC # BLD: 4.11 M/UL — SIGNIFICANT CHANGE UP (ref 3.8–5.2)
RBC # FLD: 12.9 % — SIGNIFICANT CHANGE UP (ref 10.3–14.5)
RBC # FLD: 14 %
SODIUM SERPL-SCNC: 138 MMOL/L
SODIUM SERPL-SCNC: 139 MMOL/L
WBC # BLD: 11.08 K/UL — HIGH (ref 3.8–10.5)
WBC # FLD AUTO: 11.08 K/UL — HIGH (ref 3.8–10.5)
WBC # FLD AUTO: 8.59 K/UL

## 2023-10-03 LAB
ALBUMIN MFR SERPL ELPH: 57.3 %
ALBUMIN MFR SERPL ELPH: 58.4 %
ALBUMIN SERPL-MCNC: 4.3 G/DL
ALBUMIN SERPL-MCNC: 4.8 G/DL
ALBUMIN/GLOB SERPL: 1.3 RATIO
ALBUMIN/GLOB SERPL: 1.4 RATIO
ALPHA1 GLOB MFR SERPL ELPH: 3.7 %
ALPHA1 GLOB MFR SERPL ELPH: 3.8 %
ALPHA1 GLOB SERPL ELPH-MCNC: 0.3 G/DL
ALPHA1 GLOB SERPL ELPH-MCNC: 0.3 G/DL
ALPHA2 GLOB MFR SERPL ELPH: 8.7 %
ALPHA2 GLOB MFR SERPL ELPH: 9.3 %
ALPHA2 GLOB SERPL ELPH-MCNC: 0.7 G/DL
ALPHA2 GLOB SERPL ELPH-MCNC: 0.7 G/DL
B-GLOBULIN MFR SERPL ELPH: 11.7 %
B-GLOBULIN MFR SERPL ELPH: 11.9 %
B-GLOBULIN SERPL ELPH-MCNC: 0.9 G/DL
B-GLOBULIN SERPL ELPH-MCNC: 1 G/DL
G6PD SER-CCNC: 17 U/G HGB
GAMMA GLOB FLD ELPH-MCNC: 1.3 G/DL
GAMMA GLOB FLD ELPH-MCNC: 1.4 G/DL
GAMMA GLOB MFR SERPL ELPH: 17.3 %
GAMMA GLOB MFR SERPL ELPH: 17.9 %
INTERPRETATION SERPL IEP-IMP: NORMAL
INTERPRETATION SERPL IEP-IMP: NORMAL
M PROTEIN SPEC IFE-MCNC: NORMAL
M PROTEIN SPEC IFE-MCNC: NORMAL
PROT SERPL-MCNC: 7.5 G/DL
PROT SERPL-MCNC: 7.5 G/DL
PROT SERPL-MCNC: 8.2 G/DL
PROT SERPL-MCNC: 8.2 G/DL

## 2023-10-24 ENCOUNTER — TRANSCRIPTION ENCOUNTER (OUTPATIENT)
Age: 28
End: 2023-10-24

## 2023-10-24 RX ORDER — PREDNISONE 5 MG/1
5 TABLET ORAL
Qty: 30 | Refills: 0 | Status: DISCONTINUED | COMMUNITY
Start: 2023-09-19 | End: 2023-10-24

## 2023-10-30 ENCOUNTER — NON-APPOINTMENT (OUTPATIENT)
Age: 28
End: 2023-10-30

## 2023-10-31 NOTE — HISTORY OF PRESENT ILLNESS
[FreeTextEntry1] : Updated July 2, 2021\sadie Harvey is a 25 year old woman being seen in follow up for Gaucher disease monitoring. She has been on enzyme replacement therapy of Cerezyme since August 2020 and has been doing well on it. She had extreme radiating pain from her hips to her feet during the first infusion. The infusion was stopped for 10 minutes and the pain resolved. It was then restarted at a slower rate. The pain returned, but it was milder. At her next infusion, she was premedicated with 650 mg of Tylenol and started at the initial infusion rate. The pain returned, so the rate was reduced. She reports that she was kept on the slower infusion rate for a while, but is now back to 100 ml/hour. She has not had a recurrence of pain, but does report feeling tired after the infusions. She has had to miss a few infusions this past year, but not more than 2 in a row. Candice reports feeling significant improvements since starting treatment. She is no longer as fatigued, no longer has abdominal pain, diarrhea, headaches, or difficulty breathing. She still has pain in hips and knees. However, prior to treatment she had frequent pain and is now only experiencing pain if she has walked a long distance, is going up stairs or while riding a bicycle. She says the pain is now "less random". She has not had any bone fractures. She still has easy bruising. She saw a neurologist a few months ago due to pain and numbness in the left side of her head and face. She did not have a brain MRI or EEG. Shortly after the initial neurology evaluation, she saw her gynecologist who felt the pain was likely related to her diagnosis of PCOS. She started doing acupuncture ~4 months ago and reports resolution of the pain and improvements in menstrual cramping. She has not had any major illnesses, ED visits, hospitalizations, or surgeries in the past year. Candice did not get any of the blood work or MRI's done last year, which were recommended for baseline prior to starting on Cerezyme. \par \par July 1, 2020\sadie Harvey was diagnosed with Gaucher disease at ~19 years of age. She was found to have low platelets on a routine physical and was referred to Hematology. Candice reports longstanding thrombocytopenia, reportedly since middle school. She also has a history of fatigue, difficulty keeping up with her peers, bone pain, easy bruising, abdominal fullness, stomach pains, syncopal episodes, and headaches. Her thrombocytopenia work up showed platelets fluctuating from ~50K-90K. 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 Bone marrow biopsy was done and showed Gaucher cells. Abdominal ultrasound reportedly showed hepatosplenomegaly. 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 She was referred to Dr. Pollock at [place]. Dr. Pollock initially saw her in May 2016 and noted splenomegaly on exam and osteopenia on bone density scan. She was started on Cerdelga 84 mg BID in July 2016 subsequent to CYP2D6 testing and normal EKG. Candice reports that she was compliant with treatment for ~1.5 years but Dr. Pollock noted in May 2017 that she was less than 80% compliant. Candice recalls she had no significant improvements on Cerdelga after 1.5 years and therefore decided to discontinue therapy. Her last Gaucher disease monitoring evaluation was in January 2019. She reports she was found to have progressed to osteoporosis in her hips. She reports more hip and knee pain. She describes some "popping" in her shoulders. She walks a lot but does not do other exercise as it causes exacerbation of her hip pain. She has fatigue, but doesn't feel that it's as severe as it was prior to her diagnosis. She reports difficulty breathing occurring every 1-2 weeks. Can't take a deep breath.  She sometimes has diarrhea, but notes that it is primarily when she eats gluten. She has a history of anxiety, but is currently managing it without medication.  Carac Pregnancy And Lactation Text: This medication is Pregnancy Category X and contraindicated in pregnancy and in women who may become pregnant. It is unknown if this medication is excreted in breast milk.

## 2023-12-06 ENCOUNTER — OUTPATIENT (OUTPATIENT)
Dept: OUTPATIENT SERVICES | Facility: HOSPITAL | Age: 28
LOS: 1 days | End: 2023-12-06

## 2023-12-06 DIAGNOSIS — C90.00 MULTIPLE MYELOMA NOT HAVING ACHIEVED REMISSION: ICD-10-CM

## 2023-12-14 ENCOUNTER — APPOINTMENT (OUTPATIENT)
Dept: HEMATOLOGY ONCOLOGY | Facility: CLINIC | Age: 28
End: 2023-12-14

## 2024-01-23 ENCOUNTER — APPOINTMENT (OUTPATIENT)
Dept: NEUROLOGY | Facility: CLINIC | Age: 29
End: 2024-01-23
Payer: COMMERCIAL

## 2024-01-23 VITALS
BODY MASS INDEX: 20.62 KG/M2 | SYSTOLIC BLOOD PRESSURE: 100 MMHG | HEIGHT: 60 IN | OXYGEN SATURATION: 99 % | HEART RATE: 83 BPM | DIASTOLIC BLOOD PRESSURE: 69 MMHG | WEIGHT: 105 LBS

## 2024-01-23 DIAGNOSIS — R53.83 OTHER FATIGUE: ICD-10-CM

## 2024-01-23 DIAGNOSIS — R20.2 PARESTHESIA OF SKIN: ICD-10-CM

## 2024-01-23 DIAGNOSIS — R40.4 TRANSIENT ALTERATION OF AWARENESS: ICD-10-CM

## 2024-01-23 PROCEDURE — 99215 OFFICE O/P EST HI 40 MIN: CPT

## 2024-01-23 PROCEDURE — 99205 OFFICE O/P NEW HI 60 MIN: CPT

## 2024-01-23 NOTE — HISTORY OF PRESENT ILLNESS
[FreeTextEntry1] : 28-year-old right-handed female with a history of Gaucher's disease, anxiety  presents today for  evaluation for multiple complaints   She reports in 2022 she had a bout of dizziness that lasted 6 weeks which she described as room spinning and eyes were off so ophthalmology eventually, testing was normal.  They now occur randomly sometimes with no triggers. she also reports since a diagnosis of Gaucher's disease in 2015 she has been fatigued.  She notes over the past year or so her fatigue has been worse and can sleep up to 20 hours if she can.  At times notes her speech can be off and getting her words out has not noticed if this occurs when she is under significant stressors. Also reports of random pins-and-needles in her left face and left elbow that can last for a day and resolves on its own no triggers.  Past year she has been experiencing headaches that has been occurring 2-3 times a week frontal described as pressure if severe can be associated with some nausea little to no light or sound sensitivity/takes over-the-counter medication which takes the edge of but does not abort them.  They can last hours to days at a time. no known triggers.  Started magnesium 400 mg yesterday but made her stomach upset. She reports having headaches as a teenager occipital and received nerve blocks for them which resolved completely.  She also reports having episodes of daydreaming and zoning out which can be intermittent of conversations.  Collateral from patient's  states that he can be talking to her and will get a response from her and not always recalls the conversation.  Has random anxiety feelings which she feels thoughts from her hands ankles up to her chest with laxation helps with it. Has not noticed the past year or so she had made a lot of changes got a new job, , bought a house    Risk factors: No family history of seizures No Head trauma No CNS infection No Febrile Seizures No ETOH/Drug use NVD, didnt read until age 4. was in inclusion until        FH: Sister with migraines Sleeps: 9 hours uninterrupted grinds her teeth does not use a mouthguard does not snore. Hydration: water: 40 ounces caffeine: 2 cups Triggers: unknown Exercise: None due to chronic hip pain

## 2024-01-23 NOTE — ASSESSMENT
[FreeTextEntry1] : 28-year-old right-handed female with a history of Gaucher's disease, anxiety presents today for evaluation for multiple complaints for chronic fatigue, headaches , dizziness and staring episodes that has been more pronounced over the past year.  Neuro exam nonfocal will obtain brain MRI with and without to rule out any structural lesions, 24-hour EEG rule out any epileptiform changes.   For her headaches headache diary encouraged, can take magnesium 200 mg daily.  Ibuprofen at the onset of the headaches and not to wait Patient advised to contact me if any changes to her neurological condition.  Further evaluation based on above findings.

## 2024-01-26 ENCOUNTER — APPOINTMENT (OUTPATIENT)
Dept: NEUROLOGY | Facility: CLINIC | Age: 29
End: 2024-01-26
Payer: COMMERCIAL

## 2024-01-26 PROCEDURE — 93040 RHYTHM ECG WITH REPORT: CPT

## 2024-01-26 PROCEDURE — 95819 EEG AWAKE AND ASLEEP: CPT

## 2024-01-27 PROCEDURE — 95708 EEG WO VID EA 12-26HR UNMNTR: CPT

## 2024-01-27 PROCEDURE — 95719 EEG PHYS/QHP EA INCR W/O VID: CPT

## 2024-01-27 PROCEDURE — 95700 EEG CONT REC W/VID EEG TECH: CPT

## 2024-02-07 ENCOUNTER — TRANSCRIPTION ENCOUNTER (OUTPATIENT)
Age: 29
End: 2024-02-07

## 2024-02-12 ENCOUNTER — OUTPATIENT (OUTPATIENT)
Dept: OUTPATIENT SERVICES | Facility: HOSPITAL | Age: 29
LOS: 1 days | End: 2024-02-12
Payer: COMMERCIAL

## 2024-02-12 ENCOUNTER — APPOINTMENT (OUTPATIENT)
Dept: MRI IMAGING | Facility: CLINIC | Age: 29
End: 2024-02-12
Payer: COMMERCIAL

## 2024-02-12 ENCOUNTER — TRANSCRIPTION ENCOUNTER (OUTPATIENT)
Age: 29
End: 2024-02-12

## 2024-02-12 DIAGNOSIS — R51.9 HEADACHE, UNSPECIFIED: ICD-10-CM

## 2024-02-12 DIAGNOSIS — R40.4 TRANSIENT ALTERATION OF AWARENESS: ICD-10-CM

## 2024-02-12 PROCEDURE — 70553 MRI BRAIN STEM W/O & W/DYE: CPT | Mod: 26

## 2024-02-12 PROCEDURE — 70553 MRI BRAIN STEM W/O & W/DYE: CPT

## 2024-02-15 ENCOUNTER — NON-APPOINTMENT (OUTPATIENT)
Age: 29
End: 2024-02-15

## 2024-02-23 ENCOUNTER — APPOINTMENT (OUTPATIENT)
Dept: CARDIOLOGY | Facility: CLINIC | Age: 29
End: 2024-02-23

## 2024-03-04 ENCOUNTER — INPATIENT (INPATIENT)
Facility: HOSPITAL | Age: 29
LOS: 1 days | Discharge: HOME | DRG: 884 | End: 2024-03-06
Attending: GENERAL ACUTE CARE HOSPITAL | Admitting: GENERAL ACUTE CARE HOSPITAL
Payer: COMMERCIAL

## 2024-03-04 VITALS
HEART RATE: 98 BPM | DIASTOLIC BLOOD PRESSURE: 65 MMHG | TEMPERATURE: 97 F | OXYGEN SATURATION: 99 % | SYSTOLIC BLOOD PRESSURE: 97 MMHG | RESPIRATION RATE: 18 BRPM

## 2024-03-04 DIAGNOSIS — R56.9 UNSPECIFIED CONVULSIONS: ICD-10-CM

## 2024-03-04 LAB
ANION GAP SERPL CALC-SCNC: 11 MMOL/L — SIGNIFICANT CHANGE UP (ref 5–17)
BUN SERPL-MCNC: 11.5 MG/DL — SIGNIFICANT CHANGE UP (ref 8–20)
CALCIUM SERPL-MCNC: 8.8 MG/DL — SIGNIFICANT CHANGE UP (ref 8.4–10.5)
CHLORIDE SERPL-SCNC: 102 MMOL/L — SIGNIFICANT CHANGE UP (ref 96–108)
CO2 SERPL-SCNC: 24 MMOL/L — SIGNIFICANT CHANGE UP (ref 22–29)
CREAT SERPL-MCNC: 0.68 MG/DL — SIGNIFICANT CHANGE UP (ref 0.5–1.3)
EGFR: 123 ML/MIN/1.73M2 — SIGNIFICANT CHANGE UP
GLUCOSE SERPL-MCNC: 78 MG/DL — SIGNIFICANT CHANGE UP (ref 70–99)
HCT VFR BLD CALC: 36 % — SIGNIFICANT CHANGE UP (ref 34.5–45)
HGB BLD-MCNC: 12.2 G/DL — SIGNIFICANT CHANGE UP (ref 11.5–15.5)
MAGNESIUM SERPL-MCNC: 2.1 MG/DL — SIGNIFICANT CHANGE UP (ref 1.6–2.6)
MCHC RBC-ENTMCNC: 30.3 PG — SIGNIFICANT CHANGE UP (ref 27–34)
MCHC RBC-ENTMCNC: 33.9 GM/DL — SIGNIFICANT CHANGE UP (ref 32–36)
MCV RBC AUTO: 89.6 FL — SIGNIFICANT CHANGE UP (ref 80–100)
PHOSPHATE SERPL-MCNC: 3.3 MG/DL — SIGNIFICANT CHANGE UP (ref 2.4–4.7)
PLATELET # BLD AUTO: 151 K/UL — SIGNIFICANT CHANGE UP (ref 150–400)
POTASSIUM SERPL-MCNC: 3.9 MMOL/L — SIGNIFICANT CHANGE UP (ref 3.5–5.3)
POTASSIUM SERPL-SCNC: 3.9 MMOL/L — SIGNIFICANT CHANGE UP (ref 3.5–5.3)
RBC # BLD: 4.02 M/UL — SIGNIFICANT CHANGE UP (ref 3.8–5.2)
RBC # FLD: 11.9 % — SIGNIFICANT CHANGE UP (ref 10.3–14.5)
SODIUM SERPL-SCNC: 137 MMOL/L — SIGNIFICANT CHANGE UP (ref 135–145)
WBC # BLD: 7.48 K/UL — SIGNIFICANT CHANGE UP (ref 3.8–10.5)
WBC # FLD AUTO: 7.48 K/UL — SIGNIFICANT CHANGE UP (ref 3.8–10.5)

## 2024-03-04 PROCEDURE — 99223 1ST HOSP IP/OBS HIGH 75: CPT

## 2024-03-04 PROCEDURE — 99233 SBSQ HOSP IP/OBS HIGH 50: CPT

## 2024-03-04 PROCEDURE — 95720 EEG PHY/QHP EA INCR W/VEEG: CPT

## 2024-03-04 RX ORDER — IBUPROFEN 200 MG
400 TABLET ORAL EVERY 6 HOURS
Refills: 0 | Status: DISCONTINUED | OUTPATIENT
Start: 2024-03-04 | End: 2024-03-06

## 2024-03-04 RX ADMIN — Medication 400 MILLIGRAM(S): at 18:10

## 2024-03-04 NOTE — CONSULT NOTE ADULT - SUBJECTIVE AND OBJECTIVE BOX
[facility]                                        [facility]                                  [address]                                      [address]                                           [place], [place], [address]                                                [phone]        CC: EMU admission. Rule out seizures.     HISTORY:  The patient is a 28y Female who has been having episodes of dreamlike feelings and Isabel Vu.  She is referred here for EMU monitoring in an attempt to capture episodes on simultaneous video/EEG.  Plan is to monitor through Wednesday morning.     PAST MEDICAL & SURGICAL HISTORY:  Gaucher disease Type 1    MEDICATION PRIOR TO ADMISSION:  Cerezyme infusions    Allergies  Drug Allergies Not Recorded  Pineapple (Unknown)    SOCIAL HISTORY:  Non smoker.     FAMILY HISTORY:  No family history of epilepsy    ROS:  Constitutional: The patient denies fevers or weight changes.  Neuro: As per HPI.  Eyes: Denies blurry vision.  Ears/nose/throat: Denies Tinnitus.   Cardiac: Denies chest pain. Denies palpitations.  Respiratory: Denies shortness of breath.  GI: Denies abdominal pain, nausea, or vomiting.  : Denies change in urinary pattern.  Integumentary: Denies rash.  Psych: Denies recent mood changes.  Heme: denies easy bleeding/bruising.    Exam:  Vital Signs Last 24 Hrs  T(C): 36.3 (04 Mar 2024 10:46), Max: 36.3 (04 Mar 2024 10:46)  T(F): 97.4 (04 Mar 2024 10:46), Max: 97.4 (04 Mar 2024 10:46)  HR: 98 (04 Mar 2024 10:46) (98 - 98)  BP: 97/65 (04 Mar 2024 10:46) (97/65 - 97/65)  RR: 18 (04 Mar 2024 10:46) (18 - 18)  SpO2: 99% (04 Mar 2024 10:46) (99% - 99%)    General: NAD.   Carotid bruits absent.     Mental status: The patient is awake, alert, and fully oriented. There is no aphasia. Attention span is normal. Patient is aware of current events.     Cranial nerves: There is no papilledema. Pupils react symmetrically to light. There is no visual field deficit to confrontation. Extraocular motion is full with no nystagmus.  Facial sensation is intact. Facial musculature is symmetric. Palate elevates symmetrically. Tongue is midline.    Motor: There is normal bulk and tone.  Strength is 5/5 in the right arm and leg.   Strength is 5/5 in the left arm and leg.    Sensation: Intact to light touch and pin. There is no extinction to double simultaneous stimulation.    Reflexes: 2+ throughout and plantar responses are flexor.    Cerebellar: There is no dysmetria on finger to nose testing.    LABS:                         12.2   7.48  )-----------( 151      ( 04 Mar 2024 11:30 )             36.0             RADIOLOGY   Brain MRI 2/12/24: Unremarkable.

## 2024-03-04 NOTE — H&P ADULT - ASSESSMENT
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    1. Staring spells; rule out seizure disorder  -admit to EMU  -outpatient MRI negative  -continue VEEG ordered  -no AEDS indicated at this time  -PRN Ativan  -labs unremarkable  -seizure/fall/aspiration precautions   -neuro consult appreciated    2. Gaucher's Disease  -outpatient Cerdelga twice a month    3. Vertigo  -asymptomatic  -PRN meclizine     4. Migraine Disorder  -denies pain at this time  -ibuprofen PRN    DVT ppx - early ambulation

## 2024-03-04 NOTE — H&P ADULT - HISTORY OF PRESENT ILLNESS
Patient is a 28 year old female with PMH of Gaucher's Diease (on Cerfelga on Fridays twice a month), Vertigo, Migraines, Sjogerns who was referred for an EMU admission due to concerns for seizures. Patient states that she has been experiencing several symptoms over the course of the past two years. Specifically, two years ago she had a bought of Vertigo that lasted for approximately 6 weeks and has continued to intermittently persist. States vertigo was associated with pulsating sensation in her head and eyes and affects her ability to ambulate. Over the course of time, she would has experience intermittent frontal headaches that would last for 2-3 days at a time. At times, she experiences sharp left parietal scalp pain that is associated with left facial paresthesias, staring spells, inability to focus, word finding difficult, hand tremors, forgetfulness and feelings of perla vu. She also experiences night tremors and sleep paralysis at times. Symptoms last a few seconds but she continues to intermittent experience them and went to see a neurologist. Outpatient MRI was unremarkable and therefore patient was referred for an inpatient admission to exclude seizures. Patient currently denies any acute complaints including dizziness, lightheadedness, chest pain, SOB, blurry vision, nausea/vomiting, abdominal pain, fevers, or chills.

## 2024-03-04 NOTE — PATIENT PROFILE ADULT - FALL HARM RISK - UNIVERSAL INTERVENTIONS
Bed in lowest position, wheels locked, appropriate side rails in place/Call bell, personal items and telephone in reach/Instruct patient to call for assistance before getting out of bed or chair/Non-slip footwear when patient is out of bed/[place] to call system/Physically safe environment - no spills, clutter or unnecessary equipment/Purposeful Proactive Rounding/Room/bathroom lighting operational, light cord in reach

## 2024-03-04 NOTE — CONSULT NOTE ADULT - ASSESSMENT
The patient is a 28y Female with episodes of unclear etiology.     Episodes   Rule out seizure.  EMU monitoring with simultaneous video/EEG.   No antiseizure drug for now.     Case discussed with Dr Sauceda.

## 2024-03-05 PROCEDURE — 99232 SBSQ HOSP IP/OBS MODERATE 35: CPT

## 2024-03-05 PROCEDURE — 95720 EEG PHY/QHP EA INCR W/VEEG: CPT

## 2024-03-06 ENCOUNTER — TRANSCRIPTION ENCOUNTER (OUTPATIENT)
Age: 29
End: 2024-03-06

## 2024-03-06 VITALS
OXYGEN SATURATION: 98 % | SYSTOLIC BLOOD PRESSURE: 94 MMHG | HEART RATE: 77 BPM | DIASTOLIC BLOOD PRESSURE: 64 MMHG | TEMPERATURE: 98 F

## 2024-03-06 PROCEDURE — 95714 VEEG EA 12-26 HR UNMNTR: CPT

## 2024-03-06 PROCEDURE — 95700 EEG CONT REC W/VID EEG TECH: CPT

## 2024-03-06 PROCEDURE — 83735 ASSAY OF MAGNESIUM: CPT

## 2024-03-06 PROCEDURE — 84100 ASSAY OF PHOSPHORUS: CPT

## 2024-03-06 PROCEDURE — 95718 EEG PHYS/QHP 2-12 HR W/VEEG: CPT

## 2024-03-06 PROCEDURE — 85027 COMPLETE CBC AUTOMATED: CPT

## 2024-03-06 PROCEDURE — 36415 COLL VENOUS BLD VENIPUNCTURE: CPT

## 2024-03-06 PROCEDURE — 99239 HOSP IP/OBS DSCHRG MGMT >30: CPT

## 2024-03-06 PROCEDURE — 80048 BASIC METABOLIC PNL TOTAL CA: CPT

## 2024-03-06 PROCEDURE — 99232 SBSQ HOSP IP/OBS MODERATE 35: CPT

## 2024-03-06 PROCEDURE — 95711 VEEG 2-12 HR UNMONITORED: CPT

## 2024-03-06 NOTE — DISCHARGE NOTE PROVIDER - NSDCCPCAREPLAN_GEN_ALL_CORE_FT
PRINCIPAL DISCHARGE DIAGNOSIS  Diagnosis: Staring episodes  Assessment and Plan of Treatment: - Rule out siezures  - Follow up with Neurology outpatient in 1 week      SECONDARY DISCHARGE DIAGNOSES  Diagnosis: H/O Gaucher disease  Assessment and Plan of Treatment: - Follow up with PCP in 1 week    Diagnosis: Vertigo  Assessment and Plan of Treatment: - Follow up with PCP in 1 week    Diagnosis: Migraines  Assessment and Plan of Treatment: - Follow up with PCP in 1 week

## 2024-03-06 NOTE — DISCHARGE NOTE PROVIDER - HOSPITAL COURSE
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 Pt was admitted to [facility] and Neurology was consulted. Outpatient MRI negative. Pt on EEG and no abnormalities noted. Pt has since improved and is now medically stable for discharge with appropriate outpatient follow up.     All electrolyte abnormalities were monitored carefully and repleted as necessary during this hospitalization. At the time of discharge patient was hemodynamically stable and amenable to all terms of discharge.    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 Pt was admitted to [facility] and Neurology was consulted. Outpatient MRI negative. Pt on EEG and no abnormalities noted. Pt has since improved and is now medically stable for discharge with appropriate outpatient follow up.     All electrolyte abnormalities were monitored carefully and repleted as necessary during this hospitalization. At the time of discharge patient was hemodynamically stable and amenable to all terms of discharge.

## 2024-03-06 NOTE — PROGRESS NOTE ADULT - SUBJECTIVE AND OBJECTIVE BOX
[facility]                                        Neurology at [place]                                 [facility]                                  [address]                                        [place], [place], [address]                                             [phone]        CC: EMU admission. Rule out seizures.     HPI:   The patient is a 28y Female who has been having episodes of dreamlike feelings and Isabel Vu.  She is referred here for EMU monitoring in an attempt to capture episodes on simultaneous video/EEG.  Plan is to monitor through Wednesday morning.     Interim history:  Remains in EMU.  No episodes.     ROS:   Denies headache or dizziness.  Denies chest pain.  Denies shortness of breath.    MEDICATIONS  (STANDING):  None.    Vital Signs Last 24 Hrs  T(C): 36.5 (06 Mar 2024 08:10), Max: 36.8 (06 Mar 2024 04:46)  T(F): 97.7 (06 Mar 2024 08:10), Max: 98.2 (06 Mar 2024 04:46)  HR: 84 (06 Mar 2024 08:10) (78 - 84)  BP: 93/66 (06 Mar 2024 08:10) (87/56 - 113/93)  RR: 18 (06 Mar 2024 04:46) (18 - 18)  SpO2: 96% (06 Mar 2024 08:10) (96% - 99%)    Parameters below as of 06 Mar 2024 08:10  Patient On (Oxygen Delivery Method): room air    Detailed Neurologic Exam:    Mental status: The patient is awake and alert. There is no aphasia. There is no dysarthria.     Cranial nerves: Pupils equal and react symmetrically to light. There is no visual field deficit to threat. Extraocular motion is full with no nystagmus. Facial sensation is intact. Facial musculature is symmetric. Palate elevates symmetrically. Tongue is midline.    Motor: There is normal bulk and tone.  There is no tremor.  Strength grossly 5/5 bilaterally.    Sensation: Grossly intact to light touch and pin.    Reflexes: 2+ throughout and plantar responses are flexor.    Cerebellar: No dysmetria on finger nose testing.    Labs:     03-04    137  |  102  |  11.5  ----------------------------<  78  3.9   |  24.0  |  0.68    Ca    8.8      04 Mar 2024 11:30  Phos  3.3     03-04  Mg     2.1     03-04                              12.2   7.48  )-----------( 151      ( 04 Mar 2024 11:30 )             36.0             
                            [facility]                                        Neurology at [place]                                 [facility]                                  [address]                                        [place], [place], [address]                                             [phone]        CC: EMU admission. Rule out seizures.     HPI:   The patient is a 28y Female who has been having episodes of dreamlike feelings and Isabel Vu.  She is referred here for EMU monitoring in an attempt to capture episodes on simultaneous video/EEG.  Plan is to monitor through Wednesday morning.     Interim history:  Remains in EMU.  No episodes.     ROS:   Denies headache or dizziness.  Denies chest pain.  Denies shortness of breath.    MEDICATIONS  (STANDING):  None.     Vital Signs Last 24 Hrs  T(C): 36.7 (05 Mar 2024 05:12), Max: 36.9 (04 Mar 2024 15:23)  T(F): 98 (05 Mar 2024 05:12), Max: 98.5 (04 Mar 2024 15:23)  HR: 91 (05 Mar 2024 05:12) (91 - 98)  BP: 91/50 (05 Mar 2024 05:12) (91/50 - 97/65)  RR: 18 (05 Mar 2024 05:12) (18 - 18)  SpO2: 98% (05 Mar 2024 05:12) (98% - 99%)    Detailed Neurologic Exam:    Mental status: The patient is awake, alert, and fully oriented. There is no aphasia. Attention span is normal. Patient is aware of current events.     Cranial nerves: There is no papilledema. Pupils react symmetrically to light. There is no visual field deficit to confrontation. Extraocular motion is full with no nystagmus.  Facial sensation is intact. Facial musculature is symmetric. Palate elevates symmetrically. Tongue is midline.    Motor: There is normal bulk and tone.  Strength is 5/5 in the right arm and leg.   Strength is 5/5 in the left arm and leg.    Sensation: Intact to light touch and pin. There is no extinction to double simultaneous stimulation.    Reflexes: 2+ throughout and plantar responses are flexor.    Cerebellar: There is no dysmetria on finger to nose testing.    Labs:     03-04    137  |  102  |  11.5  ----------------------------<  78  3.9   |  24.0  |  0.68    Ca    8.8      04 Mar 2024 11:30  Phos  3.3     03-04  Mg     2.1     03-04                              12.2   7.48  )-----------( 151      ( 04 Mar 2024 11:30 )             36.0           
CHIEF COMPLAINT/INTERVAL HISTORY:    Patient is a 28y old  Female who presents with a chief complaint of EMU admission (05 Mar 2024 10:38)    SUBJECTIVE & OBJECTIVE: Pt seen and examined at bedside. No overnight events. No acute events on VEEG. No acute complaints.    ROS: No chest pain, palpitations, SOB, light headedness, dizziness, headache, nausea/vomiting, fevers/chills, abdominal pain, dysuria or increased urinary frequency.    ICU Vital Signs Last 24 Hrs  T(C): 36.7 (05 Mar 2024 15:19), Max: 36.7 (05 Mar 2024 05:12)  T(F): 98 (05 Mar 2024 15:19), Max: 98 (05 Mar 2024 05:12)  HR: 79 (05 Mar 2024 15:19) (79 - 91)  BP: 106/69 (05 Mar 2024 15:19) (91/50 - 113/93)  RR: 18 (05 Mar 2024 15:19) (18 - 18)  SpO2: 99% (05 Mar 2024 15:19) (98% - 99%)    MEDICATIONS  (STANDING):    MEDICATIONS  (PRN):  ibuprofen  Tablet. 400 milliGRAM(s) Oral every 6 hours PRN Moderate Pain (4 - 6)  LORazepam   Injectable 2 milliGRAM(s) IV Push every 1 hour PRN seizures      LABS:                        12.2   7.48  )-----------( 151      ( 04 Mar 2024 11:30 )             36.0     03-04    137  |  102  |  11.5  ----------------------------<  78  3.9   |  24.0  |  0.68    Ca    8.8      04 Mar 2024 11:30  Phos  3.3     03-04  Mg     2.1     03-04        Urinalysis Basic - ( 04 Mar 2024 11:30 )    Color: x / Appearance: x / SG: x / pH: x  Gluc: 78 mg/dL / Ketone: x  / Bili: x / Urobili: x   Blood: x / Protein: x / Nitrite: x   Leuk Esterase: x / RBC: x / WBC x   Sq Epi: x / Non Sq Epi: x / Bacteria: x    Physical Exam  General - young female, laying in bed, NAD  HEENT - MMM  CVS - RRR, + S1/S2  Resp- clear  GI - soft, nontender, nondistended  Neuro- AAO x 4, no focal deficits  Ext -no pedal edema, no tremors  Skin- warm, dry

## 2024-03-06 NOTE — DISCHARGE NOTE PROVIDER - PROVIDER TOKENS
PROVIDER:[TOKEN:[[id number]],FOLLOWUP:[1 week]] PROVIDER:[TOKEN:[[id number]],FOLLOWUP:[1 week]],FREE:[LAST:[PCP],PHONE:[(   )    -],FAX:[(   )    -],FOLLOWUP:[1 week]]

## 2024-03-06 NOTE — DISCHARGE NOTE PROVIDER - CARE PROVIDERS DIRECT ADDRESSES
,[email or web address]

## 2024-03-06 NOTE — DISCHARGE NOTE PROVIDER - NSDCFUSCHEDAPPT_GEN_ALL_CORE_FT
[facility]  NEUROLOGY [address] [place] [address]  Scheduled Appointment: 03/08/2024

## 2024-03-06 NOTE — DISCHARGE NOTE NURSING/CASE MANAGEMENT/SOCIAL WORK - PATIENT PORTAL LINK FT
You can access the FollowMyHealth Patient Portal offered by [facility] by registering at the following website: [email or web address]. By joining [facility]’s FollowMyHealth portal, you will also be able to view your health information using other applications (apps) compatible with our system.

## 2024-03-06 NOTE — PROGRESS NOTE ADULT - ASSESSMENT
28y Female with episodes of unclear etiology.     Episodes   Rule out seizure.  Continue EMU monitoring with simultaneous video/EEG.   No antiseizure drug for now.     Case discussed with Dr Sauceda.  
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    1. Staring spells; rule out seizure disorder  -outpatient MRI negative  -VEEG negative for seizure activity thus far  -no AEDS indicated at this time  -PRN Ativan  -labs unremarkable  -seizure/fall/aspiration precautions   -neuro on board    2. Gaucher's Disease  -outpatient Cerdelga twice a month    3. Vertigo  -asymptomatic  -PRN meclizine     4. Migraine Disorder  -denies pain at this time  -ibuprofen PRN    DVT ppx - early ambulation      Dispo- Remains acute; continue VEEG. Probable discharge in 24 hours.    Plan discussed with patient, RN, Dr. Jalloh  
28y Female with episodes of unclear etiology.     Episodes   Rule out seizure.  EEG negative.  For discharge today.    No further specific neurologic recommendations. Will be available as needed.     Case discussed with Dr Sauceda.

## 2024-03-06 NOTE — EEG REPORT - NS EEG TEXT BOX
Patient Name: Candice Atkinson    Age: 28 year, : 1995  Hanna: [address] [place] [address] [id number]  Referring Physician: [facility] NP    Study Started: 3/4/2024 10:37  Study Ended: hh:angeli xx/xx/xxxx                -------------------------------------------------------------------------------------------------------------------------------------------------------  STUDY INFORMATION:    EEG Recording Technique:  The patient underwent continuous Video-EEG monitoring, using Telemetry System hardware on the XLTek Digital System. EEG and video data were stored on a computer hard drive with important events saved in digital archive files. The material was reviewed by a physician (electroencephalographer / epileptologist) on a daily basis. Felipe and seizure detection algorithms were utilized and reviewed. An EEG Technician attended to the patient, and was available throughout daytime work hours.  The epilepsy center neurologist was available in person or on call 24-hours per day.    EEG Placement and Labeling of Electrodes:  The EEG was performed utilizing 20 channel referential EEG connections (coronal over temporal over parasagittal montage) using all standard 10-20 electrode placements with EKG, with additional electrodes placed in the inferior temporal region using the modified 10-10 montage electrode placements for elective admissions, or if deemed necessary. Recording was at a sampling rate of 256 samples per second per channel. Time synchronized digital video recording was done simultaneously with EEG recording. A low light infrared camera was used for low light recording.     -------------------------------------------------------------------------------------------------------------------------------------------------------  HISTORY:  Patient is a 28 year old female with PMH of Gaucher's Diease (on  on  twice a month), Vertigo, Migraines, Sjogerns who was referred for an EMU admission due to concerns for seizures.    Home Antiepileptic Medication and Device  None    -------------------------------------------------------------------------------------------------------------------------------------------------------  INTERPRETATION:    DAY 1 	START: 3/4/2024  10:37     	END: 3/5/2024  08:00  	DURATION: 20 HR  19 MIN    DAILY EEG VISUAL ANALYSIS    The background was continuous, symmetric, spontaneously variable and reactive. During wakefulness, the posterior dominant rhythm consisted of symmetric, well-modulated 10 Hz activity, with amplitude to 30 uV, that attenuated to eye opening.  Low amplitude frontal beta was noted in wakefulness.   The anterior to posterior gradient was present.     BACKGROUND SLOWING:  No generalized background slowing was present.    FOCAL SLOWING:   None was present.    SLEEP BACKGROUND:  Drowsiness was characterized by fragmentation, attenuation, and slowing of the background activity.    Sleep was characterized by the presence of vertex waves, symmetric sleep spindles and K-complexes.    OTHER NON-EPILEPTIFORM FINDINGS:  None were present.    ACTIVATION PROCEDURES:   Photic stimulation was not performed.  Hyperventilation was performed and did not elicit any abnormalities.      INTERICTAL EPILEPTIFORM ACTIVITY:   None were present.    EVENTS:  No events or seizures recorded.    ARTIFACTS:  Intermittent myogenic and movement artifacts were noted.    ECG:  The heart rate on single channel ECG was predominantly between 60-80 BPM.    ASMs:   None  -------------------------------------------------------------------------------------------------------------------------------------------------------  EEG SUMMARY:  Normal EEG in the awake, drowsy and asleep states.    -------------------------------------------------------------------------------------------------------------------------------------------------------  IMPRESSION/CLINICAL CORRELATE:  This is a normal VEEG. No epileptiform pattern or seizures were recorded    -------------------------------------------------------------------------------------------------------------------------------------------------------  Ludmila Casillas MD  Fellow, [facility]    Elan Ghosh MD, PhD  Director, Epilepsy Division, [facility]    ------------------------------------  EEG Reading Room: [phone]  On Call Service After Hours: [phone]        
  DAY 2 	START: 3/5/2024  08:00     	END: 3/6/2024  10:38  	DURATION: 26 HR  38 MIN    DAILY EEG VISUAL ANALYSIS    The background was continuous, symmetric, spontaneously variable and reactive. During wakefulness, the posterior dominant rhythm consisted of symmetric, well-modulated 9 Hz activity, with amplitude to 30 uV, that attenuated to eye opening.  Low amplitude frontal beta was noted in wakefulness.   The anterior to posterior gradient was present.       BACKGROUND SLOWING:  No generalized background slowing was present.    FOCAL SLOWING:   None was present.    SLEEP BACKGROUND:  Drowsiness was characterized by fragmentation, attenuation, and slowing of the background activity.    Sleep was characterized by the presence of vertex waves, symmetric sleep spindles and K-complexes.    OTHER NON-EPILEPTIFORM FINDINGS:  None were present.    ACTIVATION PROCEDURES:   Photic stimulation was not performed.  Hyperventilation was performed and did not elicit any abnormalities.      INTERICTAL EPILEPTIFORM ACTIVITY:   None were present.    EVENTS:  No events or seizures recorded.    ARTIFACTS:  Intermittent myogenic and movement artifacts were noted.    ECG:  The heart rate on single channel ECG was predominantly between 60-80 BPM.    ASMs:   None  -------------------------------------------------------------------------------------------------------------------------------------------------------  EEG SUMMARY:  Normal EEG in the awake, drowsy and asleep states.    -------------------------------------------------------------------------------------------------------------------------------------------------------  IMPRESSION/CLINICAL CORRELATE:  This is a normal VEEG. No epileptiform pattern or seizures were recorded    -------------------------------------------------------------------------------------------------------------------------------------------------------    Ludmila Casillas MD  Fellow, [facility]       Elan Ghosh MD, PhD  Director, Epilepsy Division, [facility]    ------------------------------------  EEG Reading Room: [phone]  On Call Service After Hours: [phone]

## 2024-03-06 NOTE — DISCHARGE NOTE PROVIDER - ATTENDING DISCHARGE PHYSICAL EXAMINATION:
Physical Exam  General - young female, laying in bed, NAD  HEENT - MMM  CVS - RRR, + S1/S2  Resp- clear  GI - soft, nontender, nondistended  Neuro- AAO x 4, no focal deficits  Ext -no pedal edema, no tremors  Skin- warm, dry

## 2024-03-06 NOTE — DISCHARGE NOTE NURSING/CASE MANAGEMENT/SOCIAL WORK - NSDCPEFALRISK_GEN_ALL_CORE
For information on Fall & Injury Prevention, visit: [email or web address] OR  [email or web address] OR  https://www.cdc.gov/steadi/patient.html

## 2024-03-06 NOTE — DISCHARGE NOTE PROVIDER - CARE PROVIDER_API CALL
Nick Jalloh  Neurology  [address] [place] [address], [facility] [address]  [place] [address]  Phone: [phone]  Fax: [phone]  Follow Up Time: 1 week   Nick Jalloh  Neurology  [address] [place] [address], [address]  [place] [address]  Phone: [phone]  Fax: [phone]  Follow Up Time: 1 week    PCP,   Phone: (   )    -  Fax: (   )    -  Follow Up Time: 1 week

## 2024-03-08 ENCOUNTER — APPOINTMENT (OUTPATIENT)
Dept: NEUROLOGY | Facility: CLINIC | Age: 29
End: 2024-03-08
Payer: COMMERCIAL

## 2024-03-08 VITALS
HEART RATE: 92 BPM | DIASTOLIC BLOOD PRESSURE: 70 MMHG | HEIGHT: 60 IN | BODY MASS INDEX: 19.63 KG/M2 | WEIGHT: 100 LBS | OXYGEN SATURATION: 99 % | SYSTOLIC BLOOD PRESSURE: 102 MMHG

## 2024-03-08 DIAGNOSIS — R51.9 HEADACHE, UNSPECIFIED: ICD-10-CM

## 2024-03-08 DIAGNOSIS — G47.9 SLEEP DISORDER, UNSPECIFIED: ICD-10-CM

## 2024-03-08 PROBLEM — M35.00 SJOGREN SYNDROME, UNSPECIFIED: Chronic | Status: ACTIVE | Noted: 2024-03-04

## 2024-03-08 PROBLEM — R42 DIZZINESS AND GIDDINESS: Chronic | Status: ACTIVE | Noted: 2024-03-04

## 2024-03-08 PROBLEM — G43.909 MIGRAINE, UNSPECIFIED, NOT INTRACTABLE, WITHOUT STATUS MIGRAINOSUS: Chronic | Status: ACTIVE | Noted: 2024-03-04

## 2024-03-08 PROBLEM — Z86.39 PERSONAL HISTORY OF OTHER ENDOCRINE, NUTRITIONAL AND METABOLIC DISEASE: Chronic | Status: ACTIVE | Noted: 2024-03-04

## 2024-03-08 PROCEDURE — G2211 COMPLEX E/M VISIT ADD ON: CPT

## 2024-03-08 PROCEDURE — 99214 OFFICE O/P EST MOD 30 MIN: CPT

## 2024-03-08 NOTE — ASSESSMENT
[FreeTextEntry1] : 28-year-old right-handed female with a history of Gaucher's disease, anxiety presents today for follow up from EMU admission. for complaints of seizure like activity/ during admission, no events were captured and has not occurred since. Still with sleep disturbances for which she will complete HST and consult with Sleep specialists.    Her headaches  have improved on magnesium  and Ibuprofen prn  -continue HA diary Water intake talk therapy f/u after testing.

## 2024-03-08 NOTE — PHYSICAL EXAM
[General Appearance - In No Acute Distress] : in no acute distress [Affect] : the affect was normal [Mood] : the mood was normal [Person] : oriented to person [Place] : oriented to place [Short Term Intact] : short term memory intact [Time] : oriented to time [Cranial Nerves Optic (II)] : visual acuity intact bilaterally,  visual fields full to confrontation, pupils equal round and reactive to light [Current Events] : adequate knowledge of current events [Cranial Nerves Oculomotor (III)] : extraocular motion intact [Cranial Nerves Trigeminal (V)] : facial sensation intact symmetrically [Cranial Nerves Facial (VII)] : face symmetrical [Cranial Nerves Glossopharyngeal (IX)] : tongue and palate midline [Cranial Nerves Vestibulocochlear (VIII)] : hearing was intact bilaterally [Cranial Nerves Accessory (XI - Cranial And Spinal)] : head turning and shoulder shrug symmetric [Cranial Nerves Hypoglossal (XII)] : there was no tongue deviation with protrusion [Motor Strength] : muscle strength was normal in all four extremities [Motor Handedness Right-Handed] : the patient is right hand dominant [2+] : Ankle jerk left 2+ [PERRL With Normal Accommodation] : pupils were equal in size, round, reactive to light, with normal accommodation [] : no respiratory distress [Full Visual Field] : full visual field [No Spinal Tenderness] : no spinal tenderness

## 2024-03-08 NOTE — HISTORY OF PRESENT ILLNESS
[FreeTextEntry1] : **Interval 3.8.24: She completed EMU admission for 2 days. no evets captured. She reports feeling well. She has not had any recurrent events as  She continues to have restless sleep at night  sleep Has had 4 HAs compared to  12/mth , triggered by bright lights . resolved with ibuprofen.  Her 24 hr EEG and MRIs have been normal.  Initial  28-year-old right-handed female with a history of Gaucher's disease, anxiety  presents today for  evaluation for multiple complaints   She reports in 2022 she had a bout of dizziness that lasted 6 weeks which she described as room spinning and eyes were off so ophthalmology eventually, testing was normal.  They now occur randomly sometimes with no triggers. she also reports since a diagnosis of Gaucher's disease in 2015 she has been fatigued.  She notes over the past year or so her fatigue has been worse and can sleep up to 20 hours if she can.  At times notes her speech can be off and getting her words out has not noticed if this occurs when she is under significant stressors. Also reports of random pins-and-needles in her left face and left elbow that can last for a day and resolves on its own no triggers.  Past year she has been experiencing headaches that has been occurring 2-3 times a week frontal described as pressure if severe can be associated with some nausea little to no light or sound sensitivity/takes over-the-counter medication which takes the edge of but does not abort them.  They can last hours to days at a time. no known triggers She reports having headaches as a teenager occipital and received nerve blocks for them which resolved completely.  She also reports having episodes of daydreaming and zoning out which can be intermittent of conversations.  Collateral from patient's  states that he can be talking to her and will get a response from her and not always recalls the conversation.  Has random anxiety feelings which she feels thoughts from her hands ankles up to her chest with laxation helps with it. Has not noticed the past year or so she had made a lot of changes got a new job, , bought a house    Risk factors: No family history of seizures No Head trauma No CNS infection No Febrile Seizures No ETOH/Drug use NVD, didnt read until age 4. was in inclusion until 5th grade       FH: Sister with migraines Sleeps: 9 hours uninterrupted grinds her teeth does not use a mouthguard does not snore. Hydration: water: 40 ounces caffeine: 2 cups Triggers: unknown Exercise: None due to chronic hip pain

## 2024-03-22 ENCOUNTER — APPOINTMENT (OUTPATIENT)
Dept: NEUROLOGY | Facility: CLINIC | Age: 29
End: 2024-03-22

## 2024-03-28 ENCOUNTER — TRANSCRIPTION ENCOUNTER (OUTPATIENT)
Age: 29
End: 2024-03-28

## 2024-03-29 ENCOUNTER — NON-APPOINTMENT (OUTPATIENT)
Age: 29
End: 2024-03-29

## 2024-03-29 ENCOUNTER — TRANSCRIPTION ENCOUNTER (OUTPATIENT)
Age: 29
End: 2024-03-29

## 2024-04-03 ENCOUNTER — APPOINTMENT (OUTPATIENT)
Dept: OBGYN | Facility: CLINIC | Age: 29
End: 2024-04-03

## 2024-06-07 ENCOUNTER — APPOINTMENT (OUTPATIENT)
Dept: CARDIOLOGY | Facility: CLINIC | Age: 29
End: 2024-06-07

## 2024-09-17 ENCOUNTER — APPOINTMENT (OUTPATIENT)
Age: 29
End: 2024-09-17

## 2024-09-17 ENCOUNTER — APPOINTMENT (OUTPATIENT)
Dept: PEDIATRIC MEDICAL GENETICS | Facility: TELEHEALTH | Age: 29
End: 2024-09-17
Payer: COMMERCIAL

## 2024-09-17 DIAGNOSIS — E75.22 GAUCHER DISEASE: ICD-10-CM

## 2024-09-17 PROCEDURE — 99215 OFFICE O/P EST HI 40 MIN: CPT | Mod: 95

## 2024-09-17 PROCEDURE — G2211 COMPLEX E/M VISIT ADD ON: CPT | Mod: NC

## 2024-09-18 NOTE — HISTORY OF PRESENT ILLNESS
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   Her nurse is back and she received her last infusion on Friday 9/13/24. She reports constant fatigue and does not feel that this improved even when she was getting regular infusions. She has been seen by Rheumatology and was diagnosed with Sjogren's syndrome for which she was being treated for a period. Patient reports feeling worse while on the medication. She also has a history of migraines, spacing out and word slurring. Due to these symptoms she was seen in an epilepsy clinic. Seizures were ruled out. She was also seen for consideration of a multiple sclerosis diagnosis, but this was ruled out. She was supposed to undergo surgery for an ovarian cyst, but the cyst spontaneously resolved.  She denies bruising or bleeding. She reports easy satiety and abdominal bloating after eating. She has pain and/or soreness in hips, knees, and ankles. She switched from Cerdelga to Cerezyme in the fall of 2023 and reports a period of 5 months of consistently receiving ERT. She does not feel that her fatigue, abdominal bloating, and pain decreased when on ERT consistently. She has urinary frequency. She has regular menstrual cycles, lasting 3 days.

## 2024-09-18 NOTE — PLAN
[TextEntry] : 1. Routine labs: garett, CBC, CMP, glucopsychosine, UA 2. Recommend switch to VPRIV - will start at local infusion center for 3 infusions 3. Follow-up in 6 months

## 2024-11-04 ENCOUNTER — NON-APPOINTMENT (OUTPATIENT)
Age: 29
End: 2024-11-04

## 2025-01-23 ENCOUNTER — NON-APPOINTMENT (OUTPATIENT)
Age: 30
End: 2025-01-23

## 2025-04-10 ENCOUNTER — APPOINTMENT (OUTPATIENT)
Dept: PEDIATRIC MEDICAL GENETICS | Facility: TELEHEALTH | Age: 30
End: 2025-04-10

## 2025-04-10 DIAGNOSIS — R53.83 OTHER FATIGUE: ICD-10-CM

## 2025-04-10 DIAGNOSIS — R25.1 TREMOR, UNSPECIFIED: ICD-10-CM

## 2025-04-10 DIAGNOSIS — E75.22 GAUCHER DISEASE: ICD-10-CM

## 2025-04-10 PROCEDURE — 99215 OFFICE O/P EST HI 40 MIN: CPT | Mod: 95

## 2025-04-30 ENCOUNTER — NON-APPOINTMENT (OUTPATIENT)
Age: 30
End: 2025-04-30

## 2025-04-30 ENCOUNTER — LABORATORY RESULT (OUTPATIENT)
Age: 30
End: 2025-04-30

## 2025-04-30 ENCOUNTER — APPOINTMENT (OUTPATIENT)
Dept: RHEUMATOLOGY | Facility: CLINIC | Age: 30
End: 2025-04-30
Payer: COMMERCIAL

## 2025-04-30 VITALS
DIASTOLIC BLOOD PRESSURE: 72 MMHG | BODY MASS INDEX: 19.63 KG/M2 | HEIGHT: 60 IN | HEART RATE: 89 BPM | SYSTOLIC BLOOD PRESSURE: 110 MMHG | WEIGHT: 100 LBS | TEMPERATURE: 97.6 F | OXYGEN SATURATION: 99 %

## 2025-04-30 DIAGNOSIS — R21 RASH AND OTHER NONSPECIFIC SKIN ERUPTION: ICD-10-CM

## 2025-04-30 PROCEDURE — 99215 OFFICE O/P EST HI 40 MIN: CPT

## 2025-05-01 LAB
ALBUMIN SERPL ELPH-MCNC: 4.6 G/DL
ALP BLD-CCNC: 39 U/L
ALT SERPL-CCNC: 13 U/L
ANION GAP SERPL CALC-SCNC: 15 MMOL/L
APPEARANCE: CLEAR
AST SERPL-CCNC: 20 U/L
B2 GLYCOPROT1 IGA SERPL IA-ACNC: <2 U/ML
B2 GLYCOPROT1 IGG SER-ACNC: <1.4 U/ML
B2 GLYCOPROT1 IGM SER-ACNC: <1.5 U/ML
BASOPHILS # BLD AUTO: 0.05 K/UL
BASOPHILS NFR BLD AUTO: 0.6 %
BILIRUB SERPL-MCNC: 0.3 MG/DL
BILIRUBIN URINE: NEGATIVE
BLOOD URINE: NEGATIVE
BUN SERPL-MCNC: 14 MG/DL
C3 SERPL-MCNC: 86 MG/DL
C4 SERPL-MCNC: 21 MG/DL
CALCIUM SERPL-MCNC: 9.6 MG/DL
CARDIOLIPIN IGM SER-MCNC: 2 MPL U/ML
CARDIOLIPIN IGM SER-MCNC: <1.6 GPL U/ML
CCP AB SER IA-ACNC: <8 U/ML
CENTROMERE IGG SER-ACNC: <0.2 AL
CHLORIDE SERPL-SCNC: 103 MMOL/L
CO2 SERPL-SCNC: 19 MMOL/L
COLOR: YELLOW
CONFIRM: 40.1 SEC
CREAT SERPL-MCNC: 0.55 MG/DL
CRP SERPL-MCNC: <3 MG/L
DEPRECATED CARDIOLIPIN IGA SER: <2 APL U/ML
DRVVT IMM 1:2 NP PPP: ABNORMAL
DRVVT SCREEN TO CONFIRM RATIO: 2.33 RATIO
DSDNA AB SER-ACNC: 1 IU/ML
EGFRCR SERPLBLD CKD-EPI 2021: 129 ML/MIN/1.73M2
ENA RNP AB SER IA-ACNC: 0.2 AL
ENA SCL70 IGG SER IA-ACNC: <0.2 AL
ENA SM AB SER IA-ACNC: <0.2 AL
ENA SS-A AB SER IA-ACNC: <0.2 AL
ENA SS-B AB SER IA-ACNC: <0.2 AL
EOSINOPHIL # BLD AUTO: 0.08 K/UL
EOSINOPHIL NFR BLD AUTO: 1 %
ERYTHROCYTE [SEDIMENTATION RATE] IN BLOOD BY WESTERGREN METHOD: 11 MM/HR
GLUCOSE QUALITATIVE U: NEGATIVE MG/DL
GLUCOSE SERPL-MCNC: 94 MG/DL
HCT VFR BLD CALC: 38.6 %
HGB BLD-MCNC: 12.8 G/DL
IMM GRANULOCYTES NFR BLD AUTO: 0.2 %
KETONES URINE: NEGATIVE MG/DL
LEUKOCYTE ESTERASE URINE: ABNORMAL
LYMPHOCYTES # BLD AUTO: 2.64 K/UL
LYMPHOCYTES NFR BLD AUTO: 32.2 %
MAN DIFF?: NORMAL
MCHC RBC-ENTMCNC: 29.8 PG
MCHC RBC-ENTMCNC: 33.2 G/DL
MCV RBC AUTO: 89.8 FL
MONOCYTES # BLD AUTO: 0.43 K/UL
MONOCYTES NFR BLD AUTO: 5.3 %
NEUTROPHILS # BLD AUTO: 4.97 K/UL
NEUTROPHILS NFR BLD AUTO: 60.7 %
NITRITE URINE: NEGATIVE
PH URINE: 6.5
PLATELET # BLD AUTO: 243 K/UL
POTASSIUM SERPL-SCNC: 4.4 MMOL/L
PROT SERPL-MCNC: 7.3 G/DL
PROTEIN URINE: NEGATIVE MG/DL
RBC # BLD: 4.3 M/UL
RBC # FLD: 12.5 %
RF+CCP IGG SER-IMP: NEGATIVE
RHEUMATOID FACT SER QL: <10 IU/ML
SCREEN DRVVT: 120.2 SEC
SILICA CLOTTING TIME INTERPRETATION: NORMAL
SILICA CLOTTING TIME S/C: 0.98 RATIO
SODIUM SERPL-SCNC: 137 MMOL/L
SPECIFIC GRAVITY URINE: 1.01
THYROGLOB AB SERPL-ACNC: 20.5 IU/ML
THYROPEROXIDASE AB SERPL IA-ACNC: 12.3 IU/ML
UROBILINOGEN URINE: 0.2 MG/DL
WBC # FLD AUTO: 8.19 K/UL

## 2025-05-02 ENCOUNTER — TRANSCRIPTION ENCOUNTER (OUTPATIENT)
Age: 30
End: 2025-05-02

## 2025-05-05 LAB
ALBUMIN MFR SERPL ELPH: 58.8 %
ALBUMIN SERPL-MCNC: 4.4 G/DL
ALBUMIN/GLOB SERPL: 1.5 RATIO
ALPHA1 GLOB MFR SERPL ELPH: 3.9 %
ALPHA1 GLOB SERPL ELPH-MCNC: 0.3 G/DL
ALPHA2 GLOB MFR SERPL ELPH: 9.7 %
ALPHA2 GLOB SERPL ELPH-MCNC: 0.7 G/DL
ANA SER IF-ACNC: NEGATIVE
B-GLOBULIN MFR SERPL ELPH: 11.1 %
B-GLOBULIN SERPL ELPH-MCNC: 0.8 G/DL
G6PD SER-CCNC: 12.6 U/G HGB
GAMMA GLOB FLD ELPH-MCNC: 1.2 G/DL
GAMMA GLOB MFR SERPL ELPH: 16.5 %
INTERPRETATION SERPL IEP-IMP: NORMAL
M PROTEIN SPEC IFE-MCNC: NORMAL
PROT SERPL-MCNC: 7.4 G/DL
PROT SERPL-MCNC: 7.4 G/DL

## 2025-05-13 DIAGNOSIS — R76.0 RAISED ANTIBODY TITER: ICD-10-CM

## 2025-05-13 RX ORDER — HYDROXYCHLOROQUINE SULFATE 200 MG/1
200 TABLET, FILM COATED ORAL
Qty: 30 | Refills: 2 | Status: ACTIVE | COMMUNITY
Start: 2025-05-13 | End: 1900-01-01

## 2025-06-03 ENCOUNTER — APPOINTMENT (OUTPATIENT)
Dept: RHEUMATOLOGY | Facility: CLINIC | Age: 30
End: 2025-06-03

## 2025-06-11 ENCOUNTER — TRANSCRIPTION ENCOUNTER (OUTPATIENT)
Age: 30
End: 2025-06-11

## 2025-06-16 ENCOUNTER — TRANSCRIPTION ENCOUNTER (OUTPATIENT)
Age: 30
End: 2025-06-16

## 2025-06-24 ENCOUNTER — NON-APPOINTMENT (OUTPATIENT)
Age: 30
End: 2025-06-24

## 2025-06-25 ENCOUNTER — APPOINTMENT (OUTPATIENT)
Dept: RHEUMATOLOGY | Facility: CLINIC | Age: 30
End: 2025-06-25
Payer: COMMERCIAL

## 2025-06-25 PROBLEM — M06.00 SERONEGATIVE RHEUMATOID ARTHRITIS: Status: ACTIVE | Noted: 2025-06-25

## 2025-06-25 PROCEDURE — 99214 OFFICE O/P EST MOD 30 MIN: CPT | Mod: 95

## 2025-06-25 RX ORDER — PREDNISONE 5 MG/1
5 TABLET ORAL
Qty: 30 | Refills: 0 | Status: ACTIVE | COMMUNITY
Start: 2025-06-25 | End: 1900-01-01

## 2025-08-28 ENCOUNTER — NON-APPOINTMENT (OUTPATIENT)
Age: 30
End: 2025-08-28